# Patient Record
Sex: FEMALE | Race: WHITE | NOT HISPANIC OR LATINO | Employment: FULL TIME | URBAN - METROPOLITAN AREA
[De-identification: names, ages, dates, MRNs, and addresses within clinical notes are randomized per-mention and may not be internally consistent; named-entity substitution may affect disease eponyms.]

---

## 2018-01-05 ENCOUNTER — OFFICE VISIT (OUTPATIENT)
Dept: FAMILY MEDICINE CLINIC | Facility: CLINIC | Age: 33
End: 2018-01-05

## 2018-01-05 VITALS
BODY MASS INDEX: 42.43 KG/M2 | HEIGHT: 66 IN | DIASTOLIC BLOOD PRESSURE: 98 MMHG | RESPIRATION RATE: 18 BRPM | SYSTOLIC BLOOD PRESSURE: 140 MMHG | TEMPERATURE: 98.7 F | OXYGEN SATURATION: 99 % | WEIGHT: 264 LBS | HEART RATE: 105 BPM

## 2018-01-05 DIAGNOSIS — J01.00 ACUTE MAXILLARY SINUSITIS, RECURRENCE NOT SPECIFIED: Primary | ICD-10-CM

## 2018-01-05 DIAGNOSIS — R05.9 COUGH: ICD-10-CM

## 2018-01-05 PROCEDURE — 99213 OFFICE O/P EST LOW 20 MIN: CPT | Performed by: NURSE PRACTITIONER

## 2018-01-05 RX ORDER — AZITHROMYCIN 250 MG/1
TABLET, FILM COATED ORAL
Qty: 6 TABLET | Refills: 0 | Status: SHIPPED | OUTPATIENT
Start: 2018-01-05 | End: 2018-01-10

## 2018-01-05 RX ORDER — BENZONATATE 100 MG/1
100 CAPSULE ORAL 3 TIMES DAILY PRN
Qty: 30 CAPSULE | Refills: 1 | Status: SHIPPED | OUTPATIENT
Start: 2018-01-05 | End: 2018-01-15

## 2018-01-05 RX ORDER — ALBUTEROL SULFATE 90 UG/1
2 AEROSOL, METERED RESPIRATORY (INHALATION) EVERY 6 HOURS PRN
Qty: 1 INHALER | Refills: 2 | Status: SHIPPED | OUTPATIENT
Start: 2018-01-05 | End: 2019-09-09 | Stop reason: ALTCHOICE

## 2018-01-05 NOTE — PROGRESS NOTES
Subjective   Nany Guerrero is a 32 y.o. female.   Chief Complaint   Patient presents with   • Sinusitis   • Cough    Acute Visit.     Sinusitis   The current episode started in the past 7 days. The problem has been gradually worsening since onset. There has been no fever. Her pain is at a severity of 6/10. The pain is moderate. Associated symptoms include congestion, coughing, ear pain, headaches, a hoarse voice, sinus pressure, sneezing and a sore throat. Pertinent negatives include no chills or shortness of breath. (Ears popping   ) Past treatments include oral decongestants. The treatment provided mild relief.   Cough   The current episode started in the past 7 days. The problem has been gradually worsening. The problem occurs hourly. The cough is non-productive. Associated symptoms include ear pain, headaches, a sore throat and wheezing. Pertinent negatives include no chills, fever, postnasal drip or shortness of breath. Treatments tried: decongestant  The treatment provided mild relief. Her past medical history is significant for asthma, bronchitis and environmental allergies. There is no history of bronchiectasis, COPD, emphysema or pneumonia.      Patient reports she has been sick for 4 days.    The following portions of the patient's history were reviewed and updated as appropriate: allergies, current medications, past family history, past medical history, past social history, past surgical history and problem list.    Review of Systems   Constitutional: Positive for fatigue. Negative for activity change, appetite change, chills and fever.   HENT: Positive for congestion, ear pain, hoarse voice, sinus pain, sinus pressure, sneezing and sore throat. Negative for postnasal drip.         Sinus drainage thick - yellow   Upper teeth on the right aching yesterday    Respiratory: Positive for cough and wheezing. Negative for chest tightness and shortness of breath.    Cardiovascular: Negative.   "  Gastrointestinal: Negative.    Allergic/Immunologic: Positive for environmental allergies.   Neurological: Positive for headaches.       Objective   Allergies   Allergen Reactions   • Sulfa Antibiotics      Visit Vitals   • /98 (BP Location: Left arm, Patient Position: Sitting, Cuff Size: Adult)   • Pulse 105   • Temp 98.7 °F (37.1 °C) (Oral)   • Resp 18   • Ht 167.6 cm (66\")   • Wt 120 kg (264 lb)   • LMP 12/31/2017   • SpO2 99%   • BMI 42.61 kg/m2     Blood pressure is elevated. Patient report her last sudafed was a few hours ago.       Physical Exam   Constitutional: She appears well-developed and well-nourished. She is cooperative. She appears ill.   Face is flushed.   Blood pressure - 140/98 mm/hg   BPM     HENT:   Right Ear: Hearing, tympanic membrane, external ear and ear canal normal.   Left Ear: Hearing, tympanic membrane, external ear and ear canal normal.   Nose: Mucosal edema present. Right sinus exhibits maxillary sinus tenderness. Right sinus exhibits no frontal sinus tenderness. Left sinus exhibits maxillary sinus tenderness. Left sinus exhibits no frontal sinus tenderness.   Facial tenderness more on the right side.   Purulent yellow nasal drainage.      Cardiovascular: S1 normal, S2 normal and normal heart sounds.  Tachycardia present.    Pulmonary/Chest: Effort normal and breath sounds normal.   Neurological: She is alert.   Skin: Skin is warm, dry and intact.   Vitals reviewed.      Assessment/Plan   Nany was seen today for sinusitis and cough.    Diagnoses and all orders for this visit:    Acute maxillary sinusitis, recurrence not specified  -     azithromycin (ZITHROMAX) 250 MG tablet; Take 2 tablets the first day, then 1 tablet daily for 4 days.    Cough  -     benzonatate (TESSALON) 100 MG capsule; Take 1 capsule by mouth 3 (Three) Times a Day As Needed for Cough for up to 10 days.  -     albuterol (PROVENTIL HFA;VENTOLIN HFA) 108 (90 Base) MCG/ACT inhaler; Inhale 2 puffs " Every 6 (Six) Hours As Needed for Wheezing or Shortness of Air.        Follow up with Dr. Lyn if you fail to improve or you worsen.   See sinusitis and cough patient instructions.   Follow up for blood pressure with Dr. Lyn.   Patient is agreeable with the plan.                LILLY Moody

## 2018-01-05 NOTE — PATIENT INSTRUCTIONS
Cough, Adult  Coughing is a reflex that clears your throat and your airways. Coughing helps to heal and protect your lungs. It is normal to cough occasionally, but a cough that happens with other symptoms or lasts a long time may be a sign of a condition that needs treatment. A cough may last only 2-3 weeks (acute), or it may last longer than 8 weeks (chronic).  CAUSES  Coughing is commonly caused by:  Breathing in substances that irritate your lungs.  A viral or bacterial respiratory infection.  Allergies.  Asthma.  Postnasal drip.  Smoking.  Acid backing up from the stomach into the esophagus (gastroesophageal reflux).  Certain medicines.  Chronic lung problems, including COPD (or rarely, lung cancer).  Other medical conditions such as heart failure.  HOME CARE INSTRUCTIONS   Pay attention to any changes in your symptoms. Take these actions to help with your discomfort:  Take medicines only as told by your health care provider.  If you were prescribed an antibiotic medicine, take it as told by your health care provider. Do not stop taking the antibiotic even if you start to feel better.  Talk with your health care provider before you take a cough suppressant medicine.  Drink enough fluid to keep your urine clear or pale yellow.  If the air is dry, use a cold steam vaporizer or humidifier in your bedroom or your home to help loosen secretions.  Avoid anything that causes you to cough at work or at home.  If your cough is worse at night, try sleeping in a semi-upright position.  Avoid cigarette smoke. If you smoke, quit smoking. If you need help quitting, ask your health care provider.  Avoid caffeine.  Avoid alcohol.  Rest as needed.  SEEK MEDICAL CARE IF:   You have new symptoms.  You cough up pus.  Your cough does not get better after 2-3 weeks, or your cough gets worse.  You cannot control your cough with suppressant medicines and you are losing sleep.  You develop pain that is getting worse or pain that is not  controlled with pain medicines.  You have a fever.  You have unexplained weight loss.  You have night sweats.  SEEK IMMEDIATE MEDICAL CARE IF:  You cough up blood.  You have difficulty breathing.  Your heartbeat is very fast.     This information is not intended to replace advice given to you by your health care provider. Make sure you discuss any questions you have with your health care provider.     Document Released: 06/15/2012 Document Revised: 09/07/2016 Document Reviewed: 02/24/2016  [employer] Interactive Patient Education ©2017 [employer]  Sinusitis, Adult  Sinusitis is soreness and inflammation of your sinuses. Sinuses are hollow spaces in the bones around your face. Your sinuses are located:  · Around your eyes.  · In the middle of your forehead.  · Behind your nose.  · In your cheekbones.  Your sinuses and nasal passages are lined with a stringy fluid (mucus). Mucus normally drains out of your sinuses. When your nasal tissues become inflamed or swollen, the mucus can become trapped or blocked so air cannot flow through your sinuses. This allows bacteria, viruses, and funguses to grow, which leads to infection.  Sinusitis can develop quickly and last for 7-10 days (acute) or for more than 12 weeks (chronic). Sinusitis often develops after a cold.  CAUSES  This condition is caused by anything that creates swelling in the sinuses or stops mucus from draining, including:  · Allergies.  · Asthma.  · Bacterial or viral infection.  · Abnormally shaped bones between the nasal passages.  · Nasal growths that contain mucus (nasal polyps).  · Narrow sinus openings.  · Pollutants, such as chemicals or irritants in the air.  · A foreign object stuck in the nose.  · A fungal infection. This is rare.  RISK FACTORS  The following factors may make you more likely to develop this condition:  · Having allergies or asthma.  · Having had a recent cold or respiratory tract infection.  · Having structural deformities or  blockages in your nose or sinuses.  · Having a weak immune system.  · Doing a lot of swimming or diving.  · Overusing nasal sprays.  · Smoking.  SYMPTOMS  The main symptoms of this condition are pain and a feeling of pressure around the affected sinuses. Other symptoms include:  · Upper toothache.  · Earache.  · Headache.  · Bad breath.  · Decreased sense of smell and taste.  · A cough that may get worse at night.  · Fatigue.  · Fever.  · Thick drainage from your nose. The drainage is often green and it may contain pus (purulent).  · Stuffy nose or congestion.  · Postnasal drip. This is when extra mucus collects in the throat or back of the nose.  · Swelling and warmth over the affected sinuses.  · Sore throat.  · Sensitivity to light.  DIAGNOSIS  This condition is diagnosed based on symptoms, a medical history, and a physical exam. To find out if your condition is acute or chronic, your health care provider may:  · Look in your nose for signs of nasal polyps.  · Tap over the affected sinus to check for signs of infection.  · View the inside of your sinuses using an imaging device that has a light attached (endoscope).  If your health care provider suspects that you have chronic sinusitis, you may also:  · Be tested for allergies.  · Have a sample of mucus taken from your nose (nasal culture) and checked for bacteria.  · Have a mucus sample examined to see if your sinusitis is related to an allergy.  If your sinusitis does not respond to treatment and it lasts longer than 8 weeks, you may have an MRI or CT scan to check your sinuses. These scans also help to determine how severe your infection is.  In rare cases, a bone biopsy may be done to rule out more serious types of fungal sinus disease.  TREATMENT  Treatment for sinusitis depends on the cause and whether your condition is chronic or acute. If a virus is causing your sinusitis, your symptoms will go away on their own within 10 days. You may be given medicines  to relieve your symptoms, including:  · Topical nasal decongestants. They shrink swollen nasal passages and let mucus drain from your sinuses.  · Antihistamines. These drugs block inflammation that is triggered by allergies. This can help to ease swelling in your nose and sinuses.  · Topical nasal corticosteroids. These are nasal sprays that ease inflammation and swelling in your nose and sinuses.  · Nasal saline washes. These rinses can help to get rid of thick mucus in your nose.  If your condition is caused by bacteria, you will be given an antibiotic medicine. If your condition is caused by a fungus, you will be given an antifungal medicine.  Surgery may be needed to correct underlying conditions, such as narrow nasal passages. Surgery may also be needed to remove polyps.  HOME CARE INSTRUCTIONS  Medicines  · Take, use, or apply over-the-counter and prescription medicines only as told by your health care provider. These may include nasal sprays.  · If you were prescribed an antibiotic medicine, take it as told by your health care provider. Do not stop taking the antibiotic even if you start to feel better.  Hydrate and Humidify  · Drink enough water to keep your urine clear or pale yellow. Staying hydrated will help to thin your mucus.  · Use a cool mist humidifier to keep the humidity level in your home above 50%.  · Inhale steam for 10-15 minutes, 3-4 times a day or as told by your health care provider. You can do this in the bathroom while a hot shower is running.  · Limit your exposure to cool or dry air.  Rest  · Rest as much as possible.  · Sleep with your head raised (elevated).  · Make sure to get enough sleep each night.  General Instructions  · Apply a warm, moist washcloth to your face 3-4 times a day or as told by your health care provider. This will help with discomfort.  · Wash your hands often with soap and water to reduce your exposure to viruses and other germs. If soap and water are not  available, use hand .  · Do not smoke. Avoid being around people who are smoking (secondhand smoke).  · Keep all follow-up visits as told by your health care provider. This is important.  SEEK MEDICAL CARE IF:  · You have a fever.  · Your symptoms get worse.  · Your symptoms do not improve within 10 days.  SEEK IMMEDIATE MEDICAL CARE IF:  · You have a severe headache.  · You have persistent vomiting.  · You have pain or swelling around your face or eyes.  · You have vision problems.  · You develop confusion.  · Your neck is stiff.  · You have trouble breathing.     This information is not intended to replace advice given to you by your health care provider. Make sure you discuss any questions you have with your health care provider.     Document Released: 12/18/2006 Document Revised: 04/10/2017 Document Reviewed: 10/12/2016  [employer] Interactive Patient Education ©2017 [employer]

## 2018-07-11 ENCOUNTER — OFFICE VISIT (OUTPATIENT)
Dept: FAMILY MEDICINE CLINIC | Facility: CLINIC | Age: 33
End: 2018-07-11

## 2018-07-11 VITALS
HEART RATE: 101 BPM | WEIGHT: 256.8 LBS | DIASTOLIC BLOOD PRESSURE: 80 MMHG | TEMPERATURE: 97.9 F | SYSTOLIC BLOOD PRESSURE: 118 MMHG | BODY MASS INDEX: 41.27 KG/M2 | OXYGEN SATURATION: 99 % | HEIGHT: 66 IN

## 2018-07-11 DIAGNOSIS — M25.511 ACUTE PAIN OF RIGHT SHOULDER: Primary | ICD-10-CM

## 2018-07-11 PROCEDURE — 99213 OFFICE O/P EST LOW 20 MIN: CPT | Performed by: NURSE PRACTITIONER

## 2018-07-11 RX ORDER — CYCLOBENZAPRINE HCL 10 MG
10 TABLET ORAL 3 TIMES DAILY PRN
Qty: 30 TABLET | Refills: 1 | Status: SHIPPED | OUTPATIENT
Start: 2018-07-11 | End: 2019-09-09 | Stop reason: ALTCHOICE

## 2018-07-11 NOTE — PATIENT INSTRUCTIONS
Shoulder Pain  Many things can cause shoulder pain, including:  · An injury to the area.  · Overuse of the shoulder.  · Arthritis.    The source of the pain can be:  · Inflammation.  · An injury to the shoulder joint.  · An injury to a tendon, ligament, or bone.    Follow these instructions at home:  Take these actions to help with your pain:  · Squeeze a soft ball or a foam pad as much as possible. This helps to keep the shoulder from swelling. It also helps to strengthen the arm.  · Take over-the-counter and prescription medicines only as told by your health care provider.  · If directed, apply ice to the area:  ? Put ice in a plastic bag.  ? Place a towel between your skin and the bag.  ? Leave the ice on for 20 minutes, 2-3 times per day. Stop applying ice if it does not help with the pain.  · If you were given a shoulder sling or immobilizer:  ? Wear it as told.  ? Remove it to shower or bathe.  ? Move your arm as little as possible, but keep your hand moving to prevent swelling.    Contact a health care provider if:  · Your pain gets worse.  · Your pain is not relieved with medicines.  · New pain develops in your arm, hand, or fingers.  Get help right away if:  · Your arm, hand, or fingers:  ? Tingle.  ? Become numb.  ? Become swollen.  ? Become painful.  ? Turn white or blue.  This information is not intended to replace advice given to you by your health care provider. Make sure you discuss any questions you have with your health care provider.  Document Released: 09/27/2006 Document Revised: 08/13/2017 Document Reviewed: 04/11/2016  [employer] Interactive Patient Education © 2018 [employer]

## 2018-07-11 NOTE — PROGRESS NOTES
"Subjective   Nany Guerrero is a 33 y.o. female.   Chief Complaint   Patient presents with   • Shoulder Pain     right      Upper Extremity Issue   This is a new problem. The current episode started more than 1 month ago. The problem occurs intermittently. The problem has been gradually worsening. Associated symptoms include headaches, myalgias and neck pain. Pertinent negatives include no coughing or diaphoresis. The symptoms are aggravated by exertion. She has tried NSAIDs and ice for the symptoms. The treatment provided mild relief.      Started having symptoms 2 months ago.   Pain to right shoulder.   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   Works out at the [employer] using [employer].   Has been taking ibuprofen for pain.   Had some shaking in the right arm and hand a couple days ago when raising it to eat a sandwich.   Worsens as the day goes by.     The following portions of the patient's history were reviewed and updated as appropriate: allergies, current medications, past family history, past medical history, past social history, past surgical history and problem list.    Review of Systems   Constitutional: Negative for diaphoresis.   Eyes: Negative.    Respiratory: Negative.  Negative for cough.    Cardiovascular: Negative.    Gastrointestinal: Negative.    Musculoskeletal: Positive for myalgias and neck pain.   Skin: Negative.    Allergic/Immunologic: Negative.    Neurological: Positive for headaches.        Headache occasionally to the back of head.,      Psychiatric/Behavioral: Negative.        Objective   Allergies   Allergen Reactions   • Sulfa Antibiotics      Visit Vitals  /80 (BP Location: Left arm, Patient Position: Sitting, Cuff Size: Adult)   Pulse 101   Temp 97.9 °F (36.6 °C) (Temporal Artery )   Ht 167.6 cm (66\")   Wt 116 kg (256 lb 12.8 oz)   LMP 06/16/2018   SpO2 99%   BMI 41.45 kg/m²     \  Physical Exam   Cardiovascular: Normal rate and regular rhythm.    Pulmonary/Chest: Effort " normal and breath sounds normal.   Musculoskeletal: She exhibits tenderness.        Right shoulder: She exhibits decreased range of motion, tenderness, pain and spasm. She exhibits no swelling, no effusion, no crepitus, no deformity, no laceration, normal pulse and normal strength.   Skin: Skin is warm and dry. Capillary refill takes less than 2 seconds.   Psychiatric: She has a normal mood and affect. Her behavior is normal.   Vitals reviewed.      Assessment/Plan   Nany was seen today for shoulder pain.    Diagnoses and all orders for this visit:    Acute pain of right shoulder  -     MRI Shoulder Right Without Contrast; Future  -     Ambulatory Referral to Physical Therapy Evaluate and treat  -     diclofenac (VOLTAREN) 50 MG EC tablet; Take 1 tablet by mouth 2 (Two) Times a Day.  -     cyclobenzaprine (FLEXERIL) 10 MG tablet; Take 1 tablet by mouth 3 (Three) Times a Day As Needed for Muscle Spasms.        Follow up with Dr. Lyn if you worsen or fail to improve.     See shoulder pain patient instructions.          LILLY Moody

## 2018-07-18 ENCOUNTER — HOSPITAL ENCOUNTER (OUTPATIENT)
Dept: PHYSICAL THERAPY | Facility: HOSPITAL | Age: 33
Setting detail: THERAPIES SERIES
Discharge: HOME | End: 2018-07-18

## 2018-07-18 DIAGNOSIS — M25.511 ACUTE PAIN OF RIGHT SHOULDER: Primary | ICD-10-CM

## 2018-07-18 PROCEDURE — 97161 PT EVAL LOW COMPLEX 20 MIN: CPT | Performed by: PHYSICAL THERAPIST

## 2018-07-18 NOTE — THERAPY EVALUATION
Outpatient Physical Therapy Ortho Initial Evaluation  [facility]     Patient Name: Nany Guerrero  : 1985  MRN: [id number]  Today's Date: 2018      Visit Date: 2018    There is no problem list on file for this patient.       Past Medical History:   Diagnosis Date   • Asthma    • Obesity         Past Surgical History:   Procedure Laterality Date   •  SECTION     • CHOLECYSTECTOMY         Visit Dx:     ICD-10-CM ICD-9-CM   1. Acute pain of right shoulder M25.511 719.41             Patient History     Row Name 18 1000             History    Chief Complaint Pain  -HARLEEN      Type of Pain Shoulder pain   right  -HARLEEN      Date Current Problem(s) Began --   About 2 months ago  -HARLEEN      Brief Description of Current Complaint Pt. describes pain in back of shoulder into neck, stopped and then returned.  Had been working out with [employer] prior to symptom onset.  Symptoms are intermittent.    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  Has been able to continue working.  She has stopped exercising at home since onset of symptoms.  Enjoys reading, photography.  -HARLEEN      How has patient tried to help current problem? ice has not helped, ibuprofen helps  -HARLEEN      What clinical tests have you had for this problem? --   none  -HARLEEN      History of Previous Related Injuries no  -HARLEEN      Are you or can you be pregnant No  -HARLEEN         Pain     Pain Location Shoulder  -HARLEEN      Pain at Present 0  -HARLEEN      Pain at Best 0  -HARLEEN      Pain at Worst 7  -HARLEEN      Pain Frequency Intermittent  -HARLEEN      Pain Description Aching;Sharp  -HARLEEN      What Performance Factors Make the Current Problem(s) WORSE? after work, when trying to reach with arm or use R arm for basic activities.  -HARLEEN       What Performance Factors Make the Current Problem(s) BETTER? Ibuprofen  -HARLEEN      Is your sleep disturbed? No  -HARLEEN      Total hours of sleep per night 6-8  -HARLEEN         Fall Risk Assessment    Any falls in the past year: No  -HARLEEN         Daily Activities    Primary Language English  -HARLEEN      Are you able to read Yes  -HARLEEN      Are you able to write Yes  -HARLEEN        User Key  (r) = Recorded By, (t) = Taken By, (c) = Cosigned By    Initials Name Provider Type    HARLEEN Vega PT Physical Therapist                PT Ortho     Row Name 07/18/18 1400       Posture/Observations    Posture/Observations Comments FHP, protracted bilateral scapulae  -HARLEEN       Special Tests/Palpation    Special Tests/Palpation --   Pt. denies TTP.  -HARLEEN       Shoulder Impingement/Rotator Cuff Special Tests    Neer Impingement Test (RC Lesion vs. Bursitis) Right:;Positive  -HARLEEN    Full Can Test (RC Lesion) Right:;Positive  -HARLEEN    Empty Can Test (RC Lesion) Right:;Positive  -HARLEEN    Drop Arm Test (Full Thickness RC Lesion) Negative  -HARLEEN    Lift-Off Test (Subscapularis Lesion) Negative  -HARLEEN    Belly Press (Subscapularis Lesion) Negative  -HARLEEN    Speed's Test (LH of Biceps Lesion) Negative  -HARLEEN       General ROM    GENERAL ROM COMMENTS Pt. demonstrates AROM to WFL's in bilateral shoulders.  R shoulder is uncomfortable at end range elevation.  No pain with resisted rotation.  -HARLEEN       General Assessment (Manual Muscle Testing)    Comment, General Manual Muscle Testing (MMT) Assessment Bilateral UE strength is WFL's.  R resisted abduction is uncomfortable more than flexion.  -HARLEEN       Sensation    Sensation WNL? WNL  -HARLEEN       Upper Extremity Flexibility    Pect Minor Bilateral:;Moderately limited  -HARLEEN    Pect Major Bilateral:;Moderately limited  -HARLEEN      User Key  (r) = Recorded By, (t) = Taken By, (c) = Cosigned By    Initials Name Provider Type    HARLEEN Vega PT Physical Therapist                      Therapy Education  Given:  HEP  Program: New  How Provided: Verbal, Demonstration, Written  Provided to: Patient  Level of Understanding: Verbalized, Demonstrated           PT OP Goals     Row Name 07/18/18 1400          PT Short Term Goals    STG Date to Achieve 08/15/18  -     STG 1 Pt. demonstrates independence in initial HEP.  -HARLEEN     STG 2 Pt. reports decreasing intensity of pain to no worse than 4/10.  -HARLEEN     STG 3 Quick DASH score is improved to 20 or less.  -        Long Term Goals    LTG Date to Achieve 08/29/18  -     LTG 1 Pt. is independent in advanced HEP to prevent symptom recurrence.  -HARLEEN     LTG 2 Pain is occasional and no worse than 2/10.  -HARLEEN     LTG 3 Pt. demonstrates correct sitting and standing posture to reduce shoulder strain.  -        Time Calculation    PT Goal Re-Cert Due Date 10/16/18  -       User Key  (r) = Recorded By, (t) = Taken By, (c) = Cosigned By    Initials Name Provider Type    HARLEEN Merlyn Vega, PT Physical Therapist                PT Assessment/Plan     Row Name 07/18/18 1500          PT Assessment    Functional Limitations Limitations in functional capacity and performance;Performance in leisure activities;Performance in self-care ADL  -     Impairments Posture;Pain;Impaired flexibility;Poor body mechanics  -     Assessment Comments Clinical presentation consistent with mild RC strain with positive impingement signs.  Pt. will benefit from PT intervention to educate in postural improvement and strengthening to address current symptoms and prevent recurrence of symptoms.  -HARLEEN     Please refer to paper survey for additional self-reported information Yes  -HARLEEN     Rehab Potential Good  -HARLEEN     Patient/caregiver participated in establishment of treatment plan and goals Yes  -HARLEEN     Patient would benefit from skilled therapy intervention Yes  -HARLEEN        PT Plan    PT Frequency 2x/week  -HARLEEN     Predicted Duration of Therapy Intervention (Therapy Eval) 12 visits  -HARLEEN     Planned CPT's? PT  EVAL LOW COMPLEXITY: 38447;PT THER PROC EA 15 MIN: 71866;PT MANUAL THERAPY EA 15 MIN: 06000;PT NEUROMUSC RE-EDUCATION EA 15 MIN: 84360;PT IONTOPHORESIS EA 15 MIN: 90108;PT HOT/COLD PACK WC NONMCARE: 59580  -HARLEEN     PT Plan Comments PT 2x/week per POC.  -HARLEEN       User Key  (r) = Recorded By, (t) = Taken By, (c) = Cosigned By    Initials Name Provider Type    HARLEEN Vega PT Physical Therapist                              Outcome Measure Options: Quick DASH  Quick DASH  Open a tight or new jar.: Moderate Difficulty  Do heavy household chores (e.g., wash walls, wash floors): Moderate Difficulty  Carry a shopping bag or briefcase: Mild Difficulty  Wash your back: Mild Difficulty  Use a knife to cut food: No Difficulty  Recreational activities in which you take some force or impact through your arm, should or hand (e.g. golf, hammering, tennis, etc.): Mild Difficulty  During the past week, to what extent has your arm, shoulder, or hand problem interfered with your normal social activites with family, friends, neighbors or groups?: Moderately  During the past week, were you limited in your work or other regular daily activities as a result of your arm, shoulder or hand problem?: Slightly Limited  Arm, Shoulder, or hand pain: Moderate  Tingling (pins and needles) in your arm, shoulder, or hand: None  During the past week, how much difficulty have you had sleeping because of the pain in your arm, shoulder or hand?: No difficulty  Number of Questions Answered: 11  Quick DASH Score: 27.27         Time Calculation:     Therapy Suggested Charges     Code   Minutes Charges    None             Start Time: 1030     Therapy Charges for Today     Code Description Service Date Service Provider Modifiers Qty    08506980220 HC PT EVAL LOW COMPLEXITY 3 7/18/2018 Merlyn Vega, PT GP 1          PT G-Codes  Outcome Measure Options: Quick DASH         Merlyn Vega PT  7/18/2018

## 2018-07-23 ENCOUNTER — APPOINTMENT (OUTPATIENT)
Dept: PHYSICAL THERAPY | Facility: HOSPITAL | Age: 33
End: 2018-07-23

## 2018-07-25 ENCOUNTER — HOSPITAL ENCOUNTER (OUTPATIENT)
Dept: PHYSICAL THERAPY | Facility: HOSPITAL | Age: 33
Setting detail: THERAPIES SERIES
Discharge: HOME | End: 2018-07-25

## 2018-07-25 DIAGNOSIS — M25.511 ACUTE PAIN OF RIGHT SHOULDER: Primary | ICD-10-CM

## 2018-07-25 PROCEDURE — 97110 THERAPEUTIC EXERCISES: CPT

## 2018-07-25 NOTE — THERAPY TREATMENT NOTE
Outpatient Physical Therapy Ortho Treatment Note  [facility]     Patient Name: Nany Guerrero  : 1985  MRN: [id number]  Today's Date: 2018      Visit Date: 2018    Visit Dx:    ICD-10-CM ICD-9-CM   1. Acute pain of right shoulder M25.511 719.41       There is no problem list on file for this patient.       Past Medical History:   Diagnosis Date   • Asthma    • Obesity         Past Surgical History:   Procedure Laterality Date   •  SECTION     • CHOLECYSTECTOMY               PT Ortho     Row Name 18 1300       Subjective Comments    Subjective Comments Pt. reports that pectoral stretch in doorway is painful for her.  Other exercises are ok.  -HARLEEN       Subjective Pain    Able to rate subjective pain? yes  -HARLEEN    Pre-Treatment Pain Level 0  -HARLEEN    Post-Treatment Pain Level 0  -HARLEEN      User Key  (r) = Recorded By, (t) = Taken By, (c) = Cosigned By    Initials Name Provider Type    HARLEEN Vega, PT Physical Therapist                            PT Assessment/Plan     Row Name 18 1300          PT Assessment    Assessment Comments R shoulder ROM is WNL's without pain today.  Pt. has tightness in R pectoral mm. and R supraclavicular and posterior-lateral cervical mm.  -HARLEEN        PT Plan    PT Plan Comments Continue PT.  Progress/modify exercises as indicated based on symptom response.  -HARLEEN       User Key  (r) = Recorded By, (t) = Taken By, (c) = Cosigned By    Initials Name Provider Type    HARLEEN Vega, PT Physical Therapist                    Exercises     Row Name 18 1300             Subjective Comments    Subjective Comments Pt. reports that pectoral stretch in doorway is painful for her.  Other exercises are ok.  -HARLEEN         Subjective Pain    Able to rate subjective pain? yes  -HARLEEN      Pre-Treatment Pain Level 0  -HARLEEN      Post-Treatment Pain Level 0  -HARLEEN         Total Minutes    35174 - PT Therapeutic Exercise Minutes 20  -HARLEEN         Exercise 1     Exercise Name 1 Reviewed existing HEP.  Pt. was using higher arm position for pectoral stretch.  When arm position was corrected as illustrated in HEP, the stretch was not painful.  Reinforced sitting posture for work and advised frequent stretch breaks during the day.  Added cervical exercises and additional scapular retraction exercise to HEP.  -HARLEEN      Time 1 20  -HARLEEN        User Key  (r) = Recorded By, (t) = Taken By, (c) = Cosigned By    Initials Name Provider Type    HARLEEN Vega, PT Physical Therapist                                            Time Calculation:   Start Time: 1000  Total Timed Code Minutes- PT: 20 minute(s)  Therapy Suggested Charges     Code   Minutes Charges    39426 (CPT®) Hc Pt Neuromusc Re Education Ea 15 Min      63301 (CPT®) Hc Pt Ther Proc Ea 15 Min 20 1    31852 (CPT®) Hc Gait Training Ea 15 Min      47801 (CPT®) Hc Pt Therapeutic Act Ea 15 Min      43514 (CPT®) Hc Pt Manual Therapy Ea 15 Min      34675 (CPT®) Hc Pt Ther Massage- Per 15 Min      10344 (CPT®) Hc Pt Iontophoresis Ea 15 Min      66642 (CPT®) Hc Pt Elec Stim Ea-Per 15 Min      98465 (CPT®) Hc Pt Ultrasound Ea 15 Min      91560 (CPT®) Hc Pt Self Care/Mgmt/Train Ea 15 Min      Total  20 1                      Merlyn Vega, PT  7/25/2018

## 2018-07-30 ENCOUNTER — HOSPITAL ENCOUNTER (OUTPATIENT)
Dept: PHYSICAL THERAPY | Facility: HOSPITAL | Age: 33
Setting detail: THERAPIES SERIES
Discharge: HOME | End: 2018-07-30

## 2018-07-30 DIAGNOSIS — M25.511 ACUTE PAIN OF RIGHT SHOULDER: Primary | ICD-10-CM

## 2018-07-30 PROCEDURE — 97110 THERAPEUTIC EXERCISES: CPT

## 2018-07-30 NOTE — THERAPY TREATMENT NOTE
Outpatient Physical Therapy Ortho Treatment Note  [facility]     Patient Name: Nany Guerrero  : 1985  MRN: [id number]  Today's Date: 2018      Visit Date: 2018    Visit Dx:    ICD-10-CM ICD-9-CM   1. Acute pain of right shoulder M25.511 719.41       There is no problem list on file for this patient.       Past Medical History:   Diagnosis Date   • Asthma    • Obesity         Past Surgical History:   Procedure Laterality Date   •  SECTION     • CHOLECYSTECTOMY               PT Ortho     Row Name 18 1000       Subjective Comments    Subjective Comments Pt. reports mild discomfort in R UT area today.  She reports a brief episode of tingling in her R arm while riding in car during the weekend.  It resloved with movement/position change.  -HARLEEN       Subjective Pain    Able to rate subjective pain? yes  -HARLEEN    Pre-Treatment Pain Level 2  -HARLEEN    Post-Treatment Pain Level 0  -HARLEEN      User Key  (r) = Recorded By, (t) = Taken By, (c) = Cosigned By    Initials Name Provider Type    HARLEEN Vega, PT Physical Therapist                            PT Assessment/Plan     Row Name 18 1100          PT Assessment    Assessment Comments Positive median nerve tension with mild cervical radiculopathy, responsive to prescribed exercises.   -HARLEEN        PT Plan    PT Plan Comments Continue and progress as symptoms indicate.  -HARLEEN       User Key  (r) = Recorded By, (t) = Taken By, (c) = Cosigned By    Initials Name Provider Type    HARLEEN Vega PT Physical Therapist                    Exercises     Row Name 18 1000             Subjective Comments    Subjective Comments Pt. reports mild discomfort in R UT area today.  She reports a brief episode of tingling in her R arm while riding in car during the weekend.  It resloved with movement/position change.  -HARLEEN         Subjective Pain    Able to rate subjective pain? yes  -HARLEEN      Pre-Treatment Pain Level 2  -HARLEEN       Post-Treatment Pain Level 0  -HARLEEN         Total Minutes    36280 - PT Therapeutic Exercise Minutes 25  -HARLEEN         Exercise 1    Exercise Name 1 Reinforced postural education, particularly scapular retraction and depression.  Reviewed and practiced chin tuck combined with R lateral flexion, R rotation, and extension.  Pt. was advised to choose any of these exercises which are effective in alleviating symptoms.  Pt. had brief episode of R UE tingling when she relaxed her arm to side of chair.  Testing reveals (+) median nerve tension.  Added neural gliding exercises to HEP after practice in clinic today.  Pt. was able to successfully eliminate symptoms with ex. performance.  -HARLEEN      Time 1 25  -HARLEEN        User Key  (r) = Recorded By, (t) = Taken By, (c) = Cosigned By    Initials Name Provider Type    HARLEEN Vega PT Physical Therapist                                            Time Calculation:   Start Time: 1000  Total Timed Code Minutes- PT: 25 minute(s)  Therapy Suggested Charges     Code   Minutes Charges    29757 (CPT®)  Pt Neuromusc Re Education Ea 15 Min      88449 (CPT®) Hc Pt Ther Proc Ea 15 Min 25 2    59520 (CPT®) Hc Gait Training Ea 15 Min      75823 (CPT®) Hc Pt Therapeutic Act Ea 15 Min      73364 (CPT®) Hc Pt Manual Therapy Ea 15 Min      81442 (CPT®) Hc Pt Ther Massage- Per 15 Min      33497 (CPT®) Hc Pt Iontophoresis Ea 15 Min      07311 (CPT®) Hc Pt Elec Stim Ea-Per 15 Min      39909 (CPT®)  Pt Ultrasound Ea 15 Min      84864 (CPT®)  Pt Self Care/Mgmt/Train Ea 15 Min      Total  25 2                      Merlyn Vega PT  7/30/2018

## 2018-08-06 ENCOUNTER — HOSPITAL ENCOUNTER (OUTPATIENT)
Dept: PHYSICAL THERAPY | Facility: HOSPITAL | Age: 33
Setting detail: THERAPIES SERIES
Discharge: HOME | End: 2018-08-06

## 2018-08-06 DIAGNOSIS — M25.511 ACUTE PAIN OF RIGHT SHOULDER: Primary | ICD-10-CM

## 2018-08-06 PROCEDURE — 97140 MANUAL THERAPY 1/> REGIONS: CPT

## 2018-08-06 PROCEDURE — 97110 THERAPEUTIC EXERCISES: CPT

## 2018-08-06 NOTE — THERAPY TREATMENT NOTE
Outpatient Physical Therapy Ortho Treatment Note  [facility]     Patient Name: Nany Guerrero  : 1985  MRN: [id number]  Today's Date: 2018      Visit Date: 2018    Visit Dx:    ICD-10-CM ICD-9-CM   1. Acute pain of right shoulder M25.511 719.41       There is no problem list on file for this patient.       Past Medical History:   Diagnosis Date   • Asthma    • Obesity         Past Surgical History:   Procedure Laterality Date   •  SECTION     • CHOLECYSTECTOMY               PT Ortho     Row Name 18 1000       Subjective Comments    Subjective Comments Pt. has felt well most of the week, but yesterday was not good.  Pt. had feeling of weakness in her right arm as she was standing in [employer].  Today, she reports no pain at rest, and mild discomfort with R UE motion.  -HARLEEN       Subjective Pain    Able to rate subjective pain? yes  -HARLEEN    Pre-Treatment Pain Level 2  -HARLEEN    Post-Treatment Pain Level 0  -HARLEEN      User Key  (r) = Recorded By, (t) = Taken By, (c) = Cosigned By    Initials Name Provider Type    Merlyn Quinones, PT Physical Therapist                            PT Assessment/Plan     Row Name 18 1000          PT Assessment    Assessment Comments Limited cervical motion into retraction.  R shoulder mobility initially limited with foam roll exercises, improved after joint mobilization.  -HARLEEN        PT Plan    PT Plan Comments Continue and progress as tolerated.  -HARLEEN       User Key  (r) = Recorded By, (t) = Taken By, (c) = Cosigned By    Initials Name Provider Type    Merlyn Quinones, SUN Physical Therapist                    Exercises     Row Name 18 1049 18 1000 18 0900       Subjective Comments    Subjective Comments  -- Pt. has felt well most of the week, but yesterday was not good.  Pt. had feeling of weakness in her right arm as she was standing in [employer].  Today, she reports no pain at rest, and mild discomfort with R UE motion.  -HARLEEN   --       Subjective Pain    Able to rate subjective pain?  -- yes  -HARLEEN  --    Pre-Treatment Pain Level  -- 2  -HARLEEN  --    Post-Treatment Pain Level  -- 0  -HARLEEN  --       Total Minutes    71872 - PT Therapeutic Exercise Minutes  -- 20  -HARLEEN  --    74926 - PT Manual Therapy Minutes --  -HARLEEN  -- 8  -HARLEEN       Exercise 1    Exercise Name 1  -- Continued exercise in clinical setting, emphasizing cervical and scapular posture and mobility.  Added foam roll series of exercises to HEP which pt. will perform initially laying flat without foam roll.  She will continue to work on chin tuck to improve mobility into cervical retraction.  -HARLEEN  --    Time 1  -- 20  -HARLEEN  --      User Key  (r) = Recorded By, (t) = Taken By, (c) = Cosigned By    Initials Name Provider Type    Merlyn Quinones PT Physical Therapist                        Manual Rx (last 36 hours)      Manual Treatments     Row Name 08/06/18 1049 08/06/18 0900          Total Minutes    05617 - PT Manual Therapy Minutes --  -HARLEEN 8  -HARLEEN       User Key  (r) = Recorded By, (t) = Taken By, (c) = Cosigned By    Initials Name Provider Type    Merlyn Quinones, PT Physical Therapist                             Time Calculation:   Start Time: 1000  Total Timed Code Minutes- PT: 28 minute(s)  Therapy Suggested Charges     Code   Minutes Charges    38738 (CPT®) Hc Pt Neuromusc Re Education Ea 15 Min      82453 (CPT®) Hc Pt Ther Proc Ea 15 Min 20 1    08370 (CPT®) Hc Gait Training Ea 15 Min      35628 (CPT®) Hc Pt Therapeutic Act Ea 15 Min      95122 (CPT®) Hc Pt Manual Therapy Ea 15 Min 8 1    02246 (CPT®) Hc Pt Ther Massage- Per 15 Min      48574 (CPT®) Hc Pt Iontophoresis Ea 15 Min      18501 (CPT®) Hc Pt Elec Stim Ea-Per 15 Min      76966 (CPT®) Hc Pt Ultrasound Ea 15 Min      01865 (CPT®) Hc Pt Self Care/Mgmt/Train Ea 15 Min      Total  28 2                      Merlyn Vega PT  8/6/2018

## 2018-08-08 ENCOUNTER — HOSPITAL ENCOUNTER (OUTPATIENT)
Dept: PHYSICAL THERAPY | Facility: HOSPITAL | Age: 33
Setting detail: THERAPIES SERIES
Discharge: HOME | End: 2018-08-08

## 2018-08-08 DIAGNOSIS — M25.511 ACUTE PAIN OF RIGHT SHOULDER: Primary | ICD-10-CM

## 2018-08-08 PROCEDURE — 97110 THERAPEUTIC EXERCISES: CPT

## 2018-08-08 NOTE — THERAPY TREATMENT NOTE
Outpatient Physical Therapy Ortho Treatment Note  [facility]     Patient Name: Nany Guerrero  : 1985  MRN: [id number]  Today's Date: 2018      Visit Date: 2018    Visit Dx:    ICD-10-CM ICD-9-CM   1. Acute pain of right shoulder M25.511 719.41       There is no problem list on file for this patient.       Past Medical History:   Diagnosis Date   • Asthma    • Obesity         Past Surgical History:   Procedure Laterality Date   •  SECTION     • CHOLECYSTECTOMY               PT Ortho     Row Name 18 1000       Subjective Comments    Subjective Comments Pt. reports no pain this morning.  She has already been at work for a couple of hours.  Most recently prescribed exercises are going well.  -HARLEEN       Subjective Pain    Able to rate subjective pain? yes  -HARLEEN    Pre-Treatment Pain Level 0  -HARLEEN    Post-Treatment Pain Level 0  -HARLEEN    Row Name 18 1000       Subjective Comments    Subjective Comments Pt. has felt well most of the week, but yesterday was not good.  Pt. had feeling of weakness in her right arm as she was standing in [employer].  Today, she reports no pain at rest, and mild discomfort with R UE motion.  -HARLEEN       Subjective Pain    Able to rate subjective pain? yes  -HARLEEN    Pre-Treatment Pain Level 2  -HARLEEN    Post-Treatment Pain Level 0  -HARLEEN      User Key  (r) = Recorded By, (t) = Taken By, (c) = Cosigned By    Initials Name Provider Type    Merlyn Quinones PT Physical Therapist                            PT Assessment/Plan     Row Name 18 1000          PT Assessment    Assessment Comments Symptoms well-controlled today.  Pt. is doing well with HEP.  -HARLEEN        PT Plan    PT Plan Comments Continue and progress as tolerated.  -HARLEEN       User Key  (r) = Recorded By, (t) = Taken By, (c) = Cosigned By    Initials Name Provider Type    Merlyn Quinones PT Physical Therapist                    Exercises     Row Name 18 1000             Subjective  Comments    Subjective Comments Pt. reports no pain this morning.  She has already been at work for a couple of hours.  Most recently prescribed exercises are going well.  -HARLEEN         Subjective Pain    Able to rate subjective pain? yes  -HARLEEN      Pre-Treatment Pain Level 0  -HARLEEN      Post-Treatment Pain Level 0  -HARLEEN         Total Minutes    84921 - PT Therapeutic Exercise Minutes 15  -HARLEEN         Exercise 1    Exercise Name 1 Added shoulder extension and lat pull down with yellow band to HEP.  Pt. is compliant with recommendations to improve work posture and to take frequent breaks for change of position.  -HARLEEN      Time 1 15  -HARLEEN        User Key  (r) = Recorded By, (t) = Taken By, (c) = Cosigned By    Initials Name Provider Type    Merlyn Quinones, PT Physical Therapist                                            Time Calculation:   Start Time: 1000  Total Timed Code Minutes- PT: 15 minute(s)  Therapy Suggested Charges     Code   Minutes Charges    23164 (CPT®) Hc Pt Neuromusc Re Education Ea 15 Min      06890 (CPT®) Hc Pt Ther Proc Ea 15 Min 15 1    26540 (CPT®) Hc Gait Training Ea 15 Min      77646 (CPT®) Hc Pt Therapeutic Act Ea 15 Min      76454 (CPT®) Hc Pt Manual Therapy Ea 15 Min      31495 (CPT®) Hc Pt Ther Massage- Per 15 Min      93260 (CPT®) Hc Pt Iontophoresis Ea 15 Min      77130 (CPT®) Hc Pt Elec Stim Ea-Per 15 Min      96151 (CPT®) Hc Pt Ultrasound Ea 15 Min      16123 (CPT®) Hc Pt Self Care/Mgmt/Train Ea 15 Min      Total  15 1                      Merlyn Vega, PT  8/8/2018

## 2018-08-23 ENCOUNTER — APPOINTMENT (OUTPATIENT)
Dept: PHYSICAL THERAPY | Facility: HOSPITAL | Age: 33
End: 2018-08-23

## 2018-09-07 ENCOUNTER — DOCUMENTATION (OUTPATIENT)
Dept: PHYSICAL THERAPY | Facility: HOSPITAL | Age: 33
End: 2018-09-07

## 2018-09-07 DIAGNOSIS — M25.511 ACUTE PAIN OF RIGHT SHOULDER: Primary | ICD-10-CM

## 2018-09-07 NOTE — THERAPY DISCHARGE NOTE
Outpatient Physical Therapy Discharge Summary         Patient Name: Nany Guerrero  : 1985  MRN: [id number]    Today's Date: 2018    Visit Dx:    ICD-10-CM ICD-9-CM   1. Acute pain of right shoulder M25.511 719.41             PT OP Goals     Row Name 18 0900          PT Short Term Goals    STG Date to Achieve 08/15/18  -HARLEEN     STG 1 Pt. demonstrates independence in initial HEP.  -HARLEEN     STG 2 Pt. reports decreasing intensity of pain to no worse than 4/10.  -HARLEEN     STG 3 Quick DASH score is improved to 20 or less.  -HARLEEN        Long Term Goals    LTG Date to Achieve 18  -HARLEEN     LTG 1 Pt. is independent in advanced HEP to prevent symptom recurrence.  -HARLEEN     LTG 2 Pain is occasional and no worse than 2/10.  -HARLEEN     LTG 3 Pt. demonstrates correct sitting and standing posture to reduce shoulder strain.  -HARLEEN       User Key  (r) = Recorded By, (t) = Taken By, (c) = Cosigned By    Initials Name Provider Type    Merlyn Quinones, PT Physical Therapist          OP PT Discharge Summary  Date of Discharge: 18  Reason for Discharge: Patient/Caregiver request  Outcomes Achieved:  (Pt. reported feeling better.  Did not return for formal reassessment.)  Discharge Destination: Home with home program      Time Calculation:        Therapy Suggested Charges     Code   Minutes Charges    None                       Merlyn Vega, SUN  2018

## 2019-09-09 ENCOUNTER — OFFICE VISIT (OUTPATIENT)
Dept: RETAIL CLINIC | Facility: CLINIC | Age: 34
End: 2019-09-09

## 2019-09-09 VITALS
WEIGHT: 262.8 LBS | TEMPERATURE: 100.7 F | HEART RATE: 138 BPM | HEIGHT: 66 IN | OXYGEN SATURATION: 96 % | SYSTOLIC BLOOD PRESSURE: 138 MMHG | RESPIRATION RATE: 18 BRPM | DIASTOLIC BLOOD PRESSURE: 92 MMHG | BODY MASS INDEX: 42.23 KG/M2

## 2019-09-09 DIAGNOSIS — R50.9 FEVER, UNSPECIFIED FEVER CAUSE: ICD-10-CM

## 2019-09-09 DIAGNOSIS — B34.9 VIRAL ILLNESS: ICD-10-CM

## 2019-09-09 DIAGNOSIS — R68.89 FLU-LIKE SYMPTOMS: Primary | ICD-10-CM

## 2019-09-09 LAB
EXPIRATION DATE: NORMAL
FLUAV AG NPH QL: NEGATIVE
FLUBV AG NPH QL: NEGATIVE
INTERNAL CONTROL: NORMAL
Lab: NORMAL

## 2019-09-09 PROCEDURE — 87804 INFLUENZA ASSAY W/OPTIC: CPT | Performed by: NURSE PRACTITIONER

## 2019-09-09 PROCEDURE — 99213 OFFICE O/P EST LOW 20 MIN: CPT | Performed by: NURSE PRACTITIONER

## 2019-09-09 NOTE — PATIENT INSTRUCTIONS
Viral Illness, Adult  Viruses are tiny germs that can get into a person's body and cause illness. There are many different types of viruses, and they cause many types of illness. Viral illnesses can range from mild to severe. They can affect various parts of the body.  Common illnesses that are caused by a virus include colds and the flu. Viral illnesses also include serious conditions such as HIV/AIDS (human immunodeficiency virus/acquired immunodeficiency syndrome). A few viruses have been linked to certain cancers.  What are the causes?  Many types of viruses can cause illness. Viruses invade cells in your body, multiply, and cause the infected cells to malfunction or die. When the cell dies, it releases more of the virus. When this happens, you develop symptoms of the illness, and the virus continues to spread to other cells. If the virus takes over the function of the cell, it can cause the cell to divide and grow out of control, as is the case when a virus causes cancer.  Different viruses get into the body in different ways. You can get a virus by:  · Swallowing food or water that is contaminated with the virus.  · Breathing in droplets that have been coughed or sneezed into the air by an infected person.  · Touching a surface that has been contaminated with the virus and then touching your eyes, nose, or mouth.  · Being bitten by an insect or animal that carries the virus.  · Having sexual contact with a person who is infected with the virus.  · Being exposed to blood or fluids that contain the virus, either through an open cut or during a transfusion.  If a virus enters your body, your body's defense system (immune system) will try to fight the virus. You may be at higher risk for a viral illness if your immune system is weak.  What are the signs or symptoms?  Symptoms vary depending on the type of virus and the location of the cells that it invades. Common symptoms of the main types of viral illnesses  include:  Cold and flu viruses  · Fever.  · Headache.  · Sore throat.  · Muscle aches.  · Nasal congestion.  · Cough.  Digestive system (gastrointestinal) viruses  · Fever.  · Abdominal pain.  · Nausea.  · Diarrhea.  Liver viruses (hepatitis)  · Loss of appetite.  · Tiredness.  · Yellowing of the skin (jaundice).  Brain and spinal cord viruses  · Fever.  · Headache.  · Stiff neck.  · Nausea and vomiting.  · Confusion or sleepiness.  Skin viruses  · Warts.  · Itching.  · Rash.  Sexually transmitted viruses  · Discharge.  · Swelling.  · Redness.  · Rash.  How is this treated?  Viruses can be difficult to treat because they live within cells. Antibiotic medicines do not treat viruses because these drugs do not get inside cells. Treatment for a viral illness may include:  · Resting and drinking plenty of fluids.  · Medicines to relieve symptoms. These can include over-the-counter medicine for pain and fever, medicines for cough or congestion, and medicines to relieve diarrhea.  · Antiviral medicines. These drugs are available only for certain types of viruses. They may help reduce flu symptoms if taken early. There are also many antiviral medicines for hepatitis and HIV/AIDS.  Some viral illnesses can be prevented with vaccinations. A common example is the flu shot.  Follow these instructions at home:  Medicines    · Take over-the-counter and prescription medicines only as told by your health care provider.  · If you were prescribed an antiviral medicine, take it as told by your health care provider. Do not stop taking the medicine even if you start to feel better.  · Be aware of when antibiotics are needed and when they are not needed. Antibiotics do not treat viruses. If your health care provider thinks that you may have a bacterial infection as well as a viral infection, you may get an antibiotic.  ? Do not ask for an antibiotic prescription if you have been diagnosed with a viral illness. That will not make your  illness go away faster.  ? Frequently taking antibiotics when they are not needed can lead to antibiotic resistance. When this develops, the medicine no longer works against the bacteria that it normally fights.  General instructions  · Drink enough fluids to keep your urine clear or pale yellow.  · Rest as much as possible.  · Return to your normal activities as told by your health care provider. Ask your health care provider what activities are safe for you.  · Keep all follow-up visits as told by your health care provider. This is important.  How is this prevented?  Take these actions to reduce your risk of viral infection:  · Eat a healthy diet and get enough rest.  · Wash your hands often with soap and water. This is especially important when you are in public places. If soap and water are not available, use hand .  · Avoid close contact with friends and family who have a viral illness.  · If you travel to areas where viral gastrointestinal infection is common, avoid drinking water or eating raw food.  · Keep your immunizations up to date. Get a flu shot every year as told by your health care provider.  · Do not share toothbrushes, nail clippers, razors, or needles with other people.  · Always practice safe sex.    Contact a health care provider if:  · You have symptoms of a viral illness that do not go away.  · Your symptoms come back after going away.  · Your symptoms get worse.  Get help right away if:  · You have trouble breathing.  · You have a severe headache or a stiff neck.  · You have severe vomiting or abdominal pain.  This information is not intended to replace advice given to you by your health care provider. Make sure you discuss any questions you have with your health care provider.  Document Released: 04/28/2017 Document Revised: 05/31/2017 Document Reviewed: 04/28/2017  [employer] Interactive Patient Education © 2019 [employer]    Fever, Adult    A fever is an increase in the body’s  temperature. It is often defined as a temperature of 100° F (38°C) or higher. Short mild or moderate fevers often have no long-term effects. They also often do not need treatment. Moderate or high fevers may make you feel uncomfortable. Sometimes, they can also be a sign of a serious illness or disease. The sweating that may happen with repeated fevers or fevers that last a while may also cause you to not have enough fluid in your body (dehydration).  You can take your temperature with a thermometer to see if you have a fever. A measured temperature can change with:  · Age.  · Time of day.  · Where the thermometer is placed:  ? Mouth (oral).  ? Rectum (rectal).  ? Ear (tympanic).  ? Underarm (axillary).  ? Forehead (temporal).  Follow these instructions at home:  Pay attention to any changes in your symptoms. Take these actions to help with your condition:  · Take over-the-counter and prescription medicines only as told by your doctor. Follow the dosing instructions carefully.  · If you were prescribed an antibiotic medicine, take it as told by your doctor. Do not stop taking the antibiotic even if you start to feel better.  · Rest as needed.  · Drink enough fluid to keep your pee (urine) clear or pale yellow.  · Sponge yourself or bathe with room-temperature water as needed. This helps to lower your body temperature . Do not use ice water.  · Do not wear too many blankets or heavy clothes.  Contact a doctor if:  · You throw up (vomit).  · You cannot eat or drink without throwing up.  · You have watery poop (diarrhea).  · It hurts when you pee.  · Your symptoms do not get better with treatment.  · You have new symptoms.  · You feel very weak.  Get help right away if:  · You are short of breath or have trouble breathing.  · You are dizzy or you pass out (faint).  · You feel confused.  · You have signs of not having enough fluid in your body, such as:  ? A dry mouth.  ? Peeing less.  ? Looking pale.  · You have very  bad pain in your belly (abdomen).  · You keep throwing up or having water poop.  · You have a skin rash.  · Your symptoms suddenly get worse.  This information is not intended to replace advice given to you by your health care provider. Make sure you discuss any questions you have with your health care provider.  Document Released: 09/26/2009 Document Revised: 08/01/2018 Document Reviewed: 02/11/2016  [employer] Interactive Patient Education © 2019 [employer]

## 2019-09-09 NOTE — PROGRESS NOTES
Generalized Body Aches; Fever; and Sinus Problem (PRESSURE)      Subjective   Nany Guerrero is a 34 y.o. female.     Fever    This is a new problem. The current episode started today. The problem occurs constantly. The problem has been gradually improving. The maximum temperature noted was 101 to 101.9 F. The temperature was taken using an oral thermometer. Associated symptoms include congestion and muscle aches. Pertinent negatives include no abdominal pain, chest pain, coughing, diarrhea, ear pain, headaches, nausea, rash, sleepiness, sore throat, urinary pain, vomiting or wheezing. She has tried acetaminophen and NSAIDs for the symptoms. The treatment provided moderate relief.   Risk factors: no contaminated food, no contaminated water, no hx of cancer, no immunosuppression, no occupational exposure, no recent sickness, no recent travel and no sick contacts       Patient reports onset of fever, body aches, sinus pressure, and runny nose that started today. She states fever was 101.8 this afternoon. She has been taking Tylenol Cold and Flu, Ibuprofen, and Flonase for her symptoms.    There is no problem list on file for this patient.      Allergies   Allergen Reactions   • Sulfa Antibiotics         Current Outpatient Medications on File Prior to Visit   Medication Sig Dispense Refill   • [DISCONTINUED] albuterol (PROVENTIL HFA;VENTOLIN HFA) 108 (90 Base) MCG/ACT inhaler Inhale 2 puffs Every 6 (Six) Hours As Needed for Wheezing or Shortness of Air. 1 inhaler 2   • [DISCONTINUED] cyclobenzaprine (FLEXERIL) 10 MG tablet Take 1 tablet by mouth 3 (Three) Times a Day As Needed for Muscle Spasms. 30 tablet 1   • [DISCONTINUED] diclofenac (VOLTAREN) 50 MG EC tablet Take 1 tablet by mouth 2 (Two) Times a Day. 60 tablet 2     No current facility-administered medications on file prior to visit.        Past Medical History:   Diagnosis Date   • Asthma    • Obesity        Past Surgical History:   Procedure Laterality Date    •  SECTION     • CHOLECYSTECTOMY         Family History   Problem Relation Age of Onset   • Asthma Mother    • No Known Problems Father        Social History     Socioeconomic History   • Marital status:      Spouse name: Not on file   • Number of children: Not on file   • Years of education: Not on file   • Highest education level: Not on file   Tobacco Use   • Smoking status: Never Smoker   • Smokeless tobacco: Never Used   Substance and Sexual Activity   • Alcohol use: No   • Drug use: No   • Sexual activity: Yes     Comment:        Travel:  No recent travel within the last 21 days outside the [place] Denies recent travel to one of the following [place]ries:  [place], [place], [place], or [place].  Denies contact with anyone who has traveled to one of the following [place]  Countries: [place], [place], [place], or [place] within the last 21 days and is known or suspected to have Ebola.  Denies having had any contact with the human remains, blood or any bodily fluids of someone who is known or suspected to have Ebola within the last 21 days.     OB History     No data available          Review of Systems   Constitutional: Positive for fatigue and fever. Negative for chills and diaphoresis.   HENT: Positive for congestion, rhinorrhea, sinus pressure and sneezing. Negative for ear discharge, ear pain, postnasal drip, sinus pain, sore throat, tinnitus, trouble swallowing and voice change.    Respiratory: Negative for cough, chest tightness, shortness of breath and wheezing.    Cardiovascular: Negative for chest pain.   Gastrointestinal: Negative for abdominal pain, diarrhea, nausea and vomiting.   Genitourinary: Negative for dysuria.   Skin: Negative for rash.   Neurological: Negative for headaches.   All other systems reviewed and are negative.      /92 (BP Location: Left arm, Patient Position: Sitting, Cuff Size: Adult)   Pulse (!) 138   Temp (!) 100.7 °F (38.2 °C)    "Resp 18   Ht 167.6 cm (66\")   Wt 119 kg (262 lb 12.8 oz)   LMP 09/04/2019   SpO2 96%   Breastfeeding? No   BMI 42.42 kg/m²     Objective   Physical Exam   Constitutional: She is oriented to person, place, and time. She appears well-developed and well-nourished. No distress.   HENT:   Head: Normocephalic and atraumatic.   Right Ear: Hearing, external ear and ear canal normal. Tympanic membrane is injected and erythematous.   Left Ear: Hearing, external ear and ear canal normal. Tympanic membrane is injected and erythematous.   Nose: Mucosal edema and rhinorrhea present. Right sinus exhibits no maxillary sinus tenderness and no frontal sinus tenderness. Left sinus exhibits no maxillary sinus tenderness and no frontal sinus tenderness.   Mouth/Throat: Uvula is midline and mucous membranes are normal. Posterior oropharyngeal erythema present. No oropharyngeal exudate, posterior oropharyngeal edema or tonsillar abscesses.   Eyes: Conjunctivae and EOM are normal. Pupils are equal, round, and reactive to light. Right eye exhibits no discharge. Left eye exhibits no discharge. No scleral icterus.   Neck: Normal range of motion. Neck supple.   Cardiovascular: Regular rhythm and normal heart sounds. Tachycardia present.   Pulmonary/Chest: Effort normal and breath sounds normal. No tachypnea. No respiratory distress. She has no decreased breath sounds. She has no wheezes. She has no rhonchi. She has no rales.   Abdominal: Soft. Bowel sounds are normal.   Musculoskeletal: Normal range of motion.   Lymphadenopathy:     She has no cervical adenopathy.   Neurological: She is alert and oriented to person, place, and time.   Skin: Skin is warm. No rash noted. She is not diaphoretic.   Psychiatric: She has a normal mood and affect. Her behavior is normal.   Vitals reviewed.      Assessment/Plan   Nany was seen today for generalized body aches, fever and sinus problem.    Diagnoses and all orders for this visit:    Flu-like " symptoms  -     POCT Influenza A/B    Viral illness    Fever, unspecified fever cause      Plan of care discussed with patient: rest; increase fluid intake; Ibuprofen for pain/fever; continue Flonase and Tylenol Cold and Flu for symptom management; follow up with PCP for no improvement in 2-3 days, go to ER/UTC for new or worsening symptoms. Verbalized understanding.     Results for orders placed or performed in visit on 09/09/19   POCT Influenza A/B   Result Value Ref Range    Rapid Influenza A Ag Negative Negative    Rapid Influenza B Ag Negative Negative    Internal Control Passed Passed    Lot Number [id number]     Expiration Date 04/30/21        No Follow-up on file.

## 2019-12-23 ENCOUNTER — OFFICE VISIT (OUTPATIENT)
Dept: RETAIL CLINIC | Facility: CLINIC | Age: 34
End: 2019-12-23

## 2019-12-23 VITALS
HEIGHT: 66 IN | HEART RATE: 104 BPM | DIASTOLIC BLOOD PRESSURE: 80 MMHG | TEMPERATURE: 98.4 F | SYSTOLIC BLOOD PRESSURE: 110 MMHG | BODY MASS INDEX: 38.57 KG/M2 | WEIGHT: 240 LBS | RESPIRATION RATE: 20 BRPM | OXYGEN SATURATION: 99 %

## 2019-12-23 DIAGNOSIS — J06.9 UPPER RESPIRATORY TRACT INFECTION, UNSPECIFIED TYPE: Primary | ICD-10-CM

## 2019-12-23 PROCEDURE — 99213 OFFICE O/P EST LOW 20 MIN: CPT | Performed by: NURSE PRACTITIONER

## 2019-12-23 RX ORDER — PSEUDOEPHEDRINE HCL 120 MG/1
120 TABLET, FILM COATED, EXTENDED RELEASE ORAL EVERY 12 HOURS
Qty: 20 TABLET | Refills: 0 | OUTPATIENT
Start: 2019-12-23 | End: 2021-08-31

## 2019-12-23 RX ORDER — ALBUTEROL SULFATE 90 UG/1
2 AEROSOL, METERED RESPIRATORY (INHALATION) EVERY 4 HOURS PRN
Qty: 1 INHALER | Refills: 1 | OUTPATIENT
Start: 2019-12-23 | End: 2021-08-31

## 2019-12-23 RX ORDER — GUAIFENESIN AND DEXTROMETHORPHAN HYDROBROMIDE 600; 30 MG/1; MG/1
1 TABLET, EXTENDED RELEASE ORAL EVERY 12 HOURS SCHEDULED
Qty: 20 TABLET | Refills: 0 | OUTPATIENT
Start: 2019-12-23 | End: 2021-08-31

## 2019-12-23 NOTE — PATIENT INSTRUCTIONS
"Upper Respiratory Infection, Adult  An upper respiratory infection (URI) affects the nose, throat, and upper air passages. URIs are caused by germs (viruses). The most common type of URI is often called \"the common cold.\"  Medicines cannot cure URIs, but you can do things at home to relieve your symptoms. URIs usually get better within 7-10 days.  Follow these instructions at home:  Activity  · Rest as needed.  · If you have a fever, stay home from work or school until your fever is gone, or until your doctor says you may return to work or school.  ? You should stay home until you cannot spread the infection anymore (you are not contagious).  ? Your doctor may have you wear a face mask so you have less risk of spreading the infection.  Relieving symptoms  · Gargle with a salt-water mixture 3-4 times a day or as needed. To make a salt-water mixture, completely dissolve ½-1 tsp of salt in 1 cup of warm water.  · Use a cool-mist humidifier to add moisture to the air. This can help you breathe more easily.  Eating and drinking    · Drink enough fluid to keep your pee (urine) pale yellow.  · Eat soups and other clear broths.  General instructions    · Take over-the-counter and prescription medicines only as told by your doctor. These include cold medicines, fever reducers, and cough suppressants.  · Do not use any products that contain nicotine or tobacco. These include cigarettes and e-cigarettes. If you need help quitting, ask your doctor.  · Avoid being where people are smoking (avoid secondhand smoke).  · Make sure you get regular shots and get the flu shot every year.  · Keep all follow-up visits as told by your doctor. This is important.  How to avoid spreading infection to others    · Wash your hands often with soap and water. If you do not have soap and water, use hand .  · Avoid touching your mouth, face, eyes, or nose.  · Cough or sneeze into a tissue or your sleeve or elbow. Do not cough or sneeze " "into your hand or into the air.  Contact a doctor if:  · You are getting worse, not better.  · You have any of these:  ? A fever.  ? Chills.  ? Brown or red mucus in your nose.  ? Yellow or brown fluid (discharge)coming from your nose.  ? Pain in your face, especially when you bend forward.  ? Swollen neck glands.  ? Pain with swallowing.  ? White areas in the back of your throat.  Get help right away if:  · You have shortness of breath that gets worse.  · You have very bad or constant:  ? Headache.  ? Ear pain.  ? Pain in your forehead, behind your eyes, and over your cheekbones (sinus pain).  ? Chest pain.  · You have long-lasting (chronic) lung disease along with any of these:  ? Wheezing.  ? Long-lasting cough.  ? Coughing up blood.  ? A change in your usual mucus.  · You have a stiff neck.  · You have changes in your:  ? Vision.  ? Hearing.  ? Thinking.  ? Mood.  Summary  · An upper respiratory infection (URI) is caused by a germ called a virus. The most common type of URI is often called \"the common cold.\"  · URIs usually get better within 7-10 days.  · Take over-the-counter and prescription medicines only as told by your doctor.  This information is not intended to replace advice given to you by your health care provider. Make sure you discuss any questions you have with your health care provider.  Document Released: 06/05/2009 Document Revised: 08/10/2018 Document Reviewed: 08/10/2018  [employer] Interactive Patient Education © 2019 [employer]    "

## 2019-12-23 NOTE — PROGRESS NOTES
KIAN@  Nany Guerrero is a 34 y.o. female.   Chief Complaint   Patient presents with   • Cough   • Shortness of Breath      History of Present Illness   2 week h/o cough and congestion unrelieved with otc cough and cold medication. She has Asthma and is currently out of her Proventil inhaler. She has been short of air while going up stairs but has no wheezing. Her cough is non-productive and she is without fever.   The following portions of the patient's history were reviewed and updated as appropriate: allergies, current medications, past family history, past medical history, past social history, past surgical history and problem list.    Current Outpatient Medications:   •  albuterol sulfate  (90 Base) MCG/ACT inhaler, Inhale 2 puffs Every 4 (Four) Hours As Needed for Shortness of Air. With spacer please, Disp: 1 inhaler, Rfl: 1  •  guaifenesin-dextromethorphan (MUCINEX DM)  MG tablet sustained-release 12 hour tablet, Take 1 tablet by mouth Every 12 (Twelve) Hours., Disp: 20 tablet, Rfl: 0  •  pseudoephedrine (SUDAFED 12 HOUR) 120 MG 12 hr tablet, Take 1 tablet by mouth Every 12 (Twelve) Hours., Disp: 20 tablet, Rfl: 0    Allergies   Allergen Reactions   • Sulfa Antibiotics        Review of Systems   Constitutional: Positive for activity change. Negative for appetite change, chills, fatigue and fever.   HENT: Positive for congestion. Negative for ear discharge, ear pain, mouth sores, postnasal drip, rhinorrhea, sinus pressure, sinus pain, sore throat, trouble swallowing and voice change.    Respiratory: Positive for cough and shortness of breath. Negative for wheezing.    Cardiovascular: Negative.    Gastrointestinal: Negative.    Musculoskeletal: Negative.    Skin: Negative.    Allergic/Immunologic: Negative.    Neurological: Negative.    Hematological: Negative.    Psychiatric/Behavioral: Negative.        Objective     Visit Vitals  /80   Pulse 104   Temp 98.4 °F (36.9 °C)   Resp  "20   Ht 167.6 cm (66\")   Wt 109 kg (240 lb)   SpO2 99%   BMI 38.74 kg/m²         Physical Exam   Constitutional: She is oriented to person, place, and time. Vital signs are normal. She appears well-developed and well-nourished. She is cooperative.  Non-toxic appearance. She does not have a sickly appearance. She does not appear ill. No distress.   HENT:   Head: Normocephalic.   Right Ear: Tympanic membrane and external ear normal.   Left Ear: Tympanic membrane and external ear normal.   Nose: Mucosal edema present. No rhinorrhea. Right sinus exhibits no maxillary sinus tenderness and no frontal sinus tenderness. Left sinus exhibits no maxillary sinus tenderness and no frontal sinus tenderness.   Mouth/Throat: Oropharynx is clear and moist and mucous membranes are normal. No oropharyngeal exudate, posterior oropharyngeal edema or posterior oropharyngeal erythema. Tonsils are 0 on the right. Tonsils are 0 on the left.   Nasal turbinates pale and boggy with 1+ swelling without discharge.    Eyes: Conjunctivae are normal.   Neck: Normal range of motion. Neck supple.   Cardiovascular: Normal rate, regular rhythm and normal heart sounds.   No murmur heard.  Pulmonary/Chest: Effort normal and breath sounds normal. No respiratory distress. She has no wheezes.   Lymphadenopathy:     She has no cervical adenopathy.   Neurological: She is alert and oriented to person, place, and time.   Skin: Skin is warm and dry. No rash noted.   Psychiatric: She has a normal mood and affect.   Nursing note and vitals reviewed.      Lab Results (last 24 hours)     ** No results found for the last 24 hours. **          Assessment/Plan   Nany was seen today for cough and shortness of breath.    Diagnoses and all orders for this visit:    Upper respiratory tract infection, unspecified type  -     guaifenesin-dextromethorphan (MUCINEX DM)  MG tablet sustained-release 12 hour tablet; Take 1 tablet by mouth Every 12 (Twelve) Hours.  -     " albuterol sulfate  (90 Base) MCG/ACT inhaler; Inhale 2 puffs Every 4 (Four) Hours As Needed for Shortness of Air. With spacer please  -     pseudoephedrine (SUDAFED 12 HOUR) 120 MG 12 hr tablet; Take 1 tablet by mouth Every 12 (Twelve) Hours.    increase fluids and rest.   Follow up prn worsening s/s   LILLY Nash

## 2021-01-12 ENCOUNTER — IMMUNIZATION (OUTPATIENT)
Dept: VACCINE CLINIC | Facility: HOSPITAL | Age: 36
End: 2021-01-12

## 2021-01-12 PROCEDURE — 91300 HC SARSCOV02 VAC 30MCG/0.3ML IM: CPT | Performed by: INTERNAL MEDICINE

## 2021-01-12 PROCEDURE — 0001A: CPT | Performed by: INTERNAL MEDICINE

## 2021-02-02 ENCOUNTER — IMMUNIZATION (OUTPATIENT)
Dept: VACCINE CLINIC | Facility: HOSPITAL | Age: 36
End: 2021-02-02

## 2021-02-02 PROCEDURE — 91300 HC SARSCOV02 VAC 30MCG/0.3ML IM: CPT | Performed by: INTERNAL MEDICINE

## 2021-02-02 PROCEDURE — 0002A: CPT | Performed by: INTERNAL MEDICINE

## 2022-01-29 ENCOUNTER — TELEMEDICINE (OUTPATIENT)
Dept: FAMILY MEDICINE CLINIC | Facility: TELEHEALTH | Age: 37
End: 2022-01-29

## 2022-01-29 DIAGNOSIS — J06.9 VIRAL UPPER RESPIRATORY TRACT INFECTION: Primary | ICD-10-CM

## 2022-01-29 DIAGNOSIS — Z20.822 CLOSE EXPOSURE TO COVID-19 VIRUS: ICD-10-CM

## 2022-01-29 PROBLEM — J02.9 PHARYNGEAL INFLAMMATION: Status: ACTIVE | Noted: 2022-01-29

## 2022-01-29 PROBLEM — M65.939 TENOSYNOVITIS OF WRIST: Status: ACTIVE | Noted: 2022-01-29

## 2022-01-29 PROBLEM — A08.4 GASTROENTERITIS AND COLITIS, VIRAL: Status: ACTIVE | Noted: 2022-01-29

## 2022-01-29 PROBLEM — J01.90 ACUTE INFECTION OF NASAL SINUS: Status: ACTIVE | Noted: 2022-01-29

## 2022-01-29 PROBLEM — M65.9 TENOSYNOVITIS OF WRIST: Status: ACTIVE | Noted: 2022-01-29

## 2022-01-29 PROBLEM — M67.40 GANGLION OF TENDON: Status: ACTIVE | Noted: 2022-01-29

## 2022-01-29 LAB — SARS-COV-2 RNA NOSE QL NAA+PROBE: NOT DETECTED

## 2022-01-29 PROCEDURE — BHEMPVIDEOVISIT: Performed by: NURSE PRACTITIONER

## 2022-01-29 NOTE — PROGRESS NOTES
You have chosen to receive care through a telehealth visit.  Do you consent to use a video/audio connection for your medical care today? Yes     CHIEF COMPLAINT  Chief Complaint   Patient presents with   • Sore Throat         HPI  Nany Guerrero is a 36 y.o. female  presents with complaint of sore throat and congestion with recent exposure to COVID-19.     Review of Systems   Constitutional: Negative for chills, diaphoresis, fatigue and fever.   HENT: Positive for congestion, postnasal drip, rhinorrhea, sinus pressure and sore throat. Negative for sinus pain and sneezing.    Respiratory: Negative for cough, chest tightness, shortness of breath and wheezing.    Cardiovascular: Negative for chest pain.   Gastrointestinal: Negative for diarrhea, nausea and vomiting.   Musculoskeletal: Negative for myalgias.   Neurological: Positive for headaches.       Past Medical History:   Diagnosis Date   • Asthma    • Obesity        Family History   Problem Relation Age of Onset   • Asthma Mother    • No Known Problems Father        Social History     Socioeconomic History   • Marital status:    Tobacco Use   • Smoking status: Never Smoker   • Smokeless tobacco: Never Used   Substance and Sexual Activity   • Alcohol use: No   • Drug use: No   • Sexual activity: Yes     Comment:          There were no vitals taken for this visit.    PHYSICAL EXAM  Physical Exam   Constitutional: She is oriented to person, place, and time. She appears well-developed and well-nourished. She does not have a sickly appearance. She does not appear ill. No distress.   HENT:   Head: Normocephalic and atraumatic.   Eyes: EOM are normal.   Neck: Neck normal appearance.  Pulmonary/Chest: Effort normal.  No respiratory distress.  Neurological: She is alert and oriented to person, place, and time.   Skin: Skin is dry.   Psychiatric: She has a normal mood and affect.         Diagnoses and all orders for this visit:    1. Viral upper respiratory  tract infection (Primary)  -     COVID-19 PCR, [facility], NP SWAB IN [facility]AL TRANSPORT MEDIA/ORAL SWISH 24-30 HR TAT - Swab, Nasopharynx; Future  -     QUESTIONNAIRE SERIES    2. Close exposure to COVID-19 virus  -     COVID-19 PCR, [facility], NP SWAB IN [facility] V[facility]AL TRANSPORT MEDIA/ORAL SWISH 24-30 HR TAT - Swab, Nasopharynx; Future  -     QUESTIONNAIRE SERIES          FOLLOW-UP  As discussed during visit with PCP/[facility] if no improvement or Urgent Care/Emergency Department if worsening of symptoms    Patient verbalizes understanding of medication dosage, comfort measures, instructions for treatment and follow-up.    Laura Malhotra, APRN  01/29/2022  11:39 EST    This visit was performed via Telehealth.  This patient has been instructed to follow-up with their primary care provider if their symptoms worsen or the treatment provided does not resolve their illness.

## 2022-01-29 NOTE — PATIENT INSTRUCTIONS
You will need to upload your positive COVID-19 test  on [facility] at [email or web address]     I have included a COVID-19 test. Go to your nearest [facility] for testing. Tell them you have already been seen virtually and only need testing   We will notify you of your COVID-19 results by phone. You will receive a call from an unknown or blocked number. You can also get your results on your [employer] darin.    You will need to remain quarantined for 10 days from onset of symptoms and only to discontinue if symptoms have improved and no fever for 24 hours without the use of fever reducing medications such as Tylenol (acetaminophen), Advil (ibuprfen), or Aleve (naproxen). You may discontinue after 5 days if your symptoms have resolved and you have no fever for 24 hours without the use of fever reducing medications such as Tylenol (acetaminophen), Advil (ibuprfen), or Aleve (naproxen). You will need to repeat the home antigen test supplied by [facility] and it must be negative to return to work earlier than 10 days.   Continue to wear a mask for 10 days or until symptoms resolve    Symptoms of Coronavirus are treated just as you would for any viral illness. Take Tylenol and/or Ibuprofen for pain and/or fever, you may find it better to alternate these every 4 hours, so that you are only taking each every 8 hours. Stay hydrated, rest and you may treat cough with over-the-counter cough syrup such as Robitussin.  If your symptoms do not improve, symptoms change or do not fully resolve, seek further evaluation with your primary care provider or Urgent Care.     If you develop severe symptoms, such as chest pain, high fever, difficulty breathing, difficulty urinating, confusion, difficulty staying awake, blue or pale lips or have any symptoms that interfere with your ability to function go to the nearest Emergency Department immediately.

## 2022-01-31 ENCOUNTER — LAB (OUTPATIENT)
Dept: LAB | Facility: HOSPITAL | Age: 37
End: 2022-01-31

## 2022-01-31 DIAGNOSIS — Z20.822 CLOSE EXPOSURE TO COVID-19 VIRUS: ICD-10-CM

## 2022-01-31 DIAGNOSIS — J06.9 VIRAL UPPER RESPIRATORY TRACT INFECTION: ICD-10-CM

## 2022-01-31 PROCEDURE — U0004 COV-19 TEST NON-CDC HGH THRU: HCPCS

## 2024-06-18 ENCOUNTER — OFFICE VISIT (OUTPATIENT)
Dept: FAMILY MEDICINE CLINIC | Facility: CLINIC | Age: 39
End: 2024-06-18
Payer: COMMERCIAL

## 2024-06-18 VITALS
DIASTOLIC BLOOD PRESSURE: 84 MMHG | HEART RATE: 93 BPM | WEIGHT: 293 LBS | BODY MASS INDEX: 47.09 KG/M2 | HEIGHT: 66 IN | SYSTOLIC BLOOD PRESSURE: 136 MMHG | TEMPERATURE: 98.6 F

## 2024-06-18 DIAGNOSIS — Z00.00 ENCOUNTER FOR ANNUAL PHYSICAL EXAM: Primary | ICD-10-CM

## 2024-06-18 DIAGNOSIS — M62.838 MUSCLE SPASM: ICD-10-CM

## 2024-06-18 DIAGNOSIS — M54.2 CERVICALGIA: ICD-10-CM

## 2024-06-18 DIAGNOSIS — H65.02 NON-RECURRENT ACUTE SEROUS OTITIS MEDIA OF LEFT EAR: ICD-10-CM

## 2024-06-18 PROCEDURE — 99385 PREV VISIT NEW AGE 18-39: CPT | Performed by: FAMILY MEDICINE

## 2024-06-18 RX ORDER — MELOXICAM 15 MG/1
15 TABLET ORAL DAILY
Qty: 30 TABLET | Refills: 2 | Status: SHIPPED | OUTPATIENT
Start: 2024-06-18

## 2024-06-18 RX ORDER — FLUTICASONE PROPIONATE 50 MCG
2 SPRAY, SUSPENSION (ML) NASAL DAILY
Qty: 15.8 ML | Refills: 5 | Status: SHIPPED | OUTPATIENT
Start: 2024-06-18

## 2024-06-18 RX ORDER — BACLOFEN 10 MG/1
10 TABLET ORAL 2 TIMES DAILY PRN
Qty: 60 TABLET | Refills: 2 | Status: SHIPPED | OUTPATIENT
Start: 2024-06-18

## 2024-06-18 NOTE — PROGRESS NOTES
Subjective     Chief Complaint  nerve pain  (R shoulder. Has had for a while and is bothering her again)    Subjective          Nany Guerrero is a 39 y.o. female who presents today to [facility] for follow up.    HPI:   History of Present Illness    Ms. Guerrero is a very pleasant 39 year old female today to establish care with a primary care physician.     She does not have any chronic medical conditions.  She does not take any chronic medications.    Acutely she has right-sided muscle spasm and pain.  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  She does not have any history of trauma.  She states that if she paints or plays with her son etc. sometimes she will get a flare of the pain.  Is been bothering her increasingly lately.  Additionally, she has left ear irritation and itching at times and fullness.    The following portions of the patient's history were reviewed and updated as appropriate: allergies, current medications, past family history, past medical history, past social history, past surgical history and problem list.    Objective     Objective     Allergy:   Allergies   Allergen Reactions    Sulfa Antibiotics Rash        Current Medications:   Current Outpatient Medications   Medication Sig Dispense Refill    baclofen (LIORESAL) 10 MG tablet Take 1 tablet by mouth 2 (Two) Times a Day As Needed for Muscle Spasms. 60 tablet 2    fluticasone (FLONASE) 50 MCG/ACT nasal spray 2 sprays into the nostril(s) as directed by provider Daily. 15.8 mL 5    meloxicam (Mobic) 15 MG tablet Take 1 tablet by mouth Daily. 30 tablet 2     No current facility-administered medications for this visit.       Past Medical History:  Past Medical History:   Diagnosis Date    Allergic     Asthma     Obesity        Past Surgical History:  Past Surgical History:   Procedure Laterality Date     SECTION      CHOLECYSTECTOMY         Social  "History:  Social History     Socioeconomic History    Marital status:    Tobacco Use    Smoking status: Never     Passive exposure: Never    Smokeless tobacco: Never   Vaping Use    Vaping status: Never Used   Substance and Sexual Activity    Alcohol use: Never    Drug use: Never    Sexual activity: Yes     Partners: Male     Comment:        Family History:  Family History   Problem Relation Age of Onset    Asthma Mother     Hyperlipidemia Father     Vision loss Maternal Grandmother         Macular Degeneratio.       Vital Signs:   /84   Pulse 93   Temp 98.6 °F (37 °C) (Infrared)   Ht 167.6 cm (65.98\")   Wt (!) 145 kg (319 lb 6.4 oz)   BMI 51.58 kg/m²      Physical Exam:  Physical Exam  Vitals reviewed.   Constitutional:       Appearance: She is not ill-appearing.   HENT:      Left Ear: A middle ear effusion is present.   Cardiovascular:      Rate and Rhythm: Normal rate.      Pulses: Normal pulses.   Pulmonary:      Effort: Pulmonary effort is normal.      Breath sounds: Normal breath sounds.   Musculoskeletal:      Cervical back: Spasms present. Decreased range of motion.   Neurological:      General: No focal deficit present.      Mental Status: She is alert. Mental status is at baseline.   Psychiatric:         Mood and Affect: Mood normal.         Behavior: Behavior normal.         Thought Content: Thought content normal.         Judgment: Judgment normal.               PHQ-9 Score  PHQ-9 Total Score: 0     Lab Review  No visits with results within 3 Month(s) from this visit.   Latest known visit with results is:   Lab on 01/31/2022   Component Date Value Ref Range Status    SARS-CoV-2 CHER 01/31/2022 Not Detected  Not Detected Final        Radiology Results        Assessment / Plan         Assessment and Plan   Diagnoses and all orders for this visit:    1. Encounter for annual physical exam (Primary)  Assessment & Plan:  Annual wellness exam completed today. Health Maintenance including " immunizations was updated and reflected in the chart. Yearly screening labs were ordered.     Further recommendations to be given once lab data received.     Health advice: healthy food choices with fresh fruits and vegetables, maintain sleep pattern at least 8 hours, avoid texting and distracted driving practices; wear safety belt, engage in regular exercise, maintain healthy weight, use safe sex practices, avoid alcohol and illicit drugs. Maintain immunizations that are up to date. Maintain health maintenance:PAP, etc.  Follow up with PCP if struggling with depression or anxiety. Keep regular dental and eye exams. Brush and floss teeth daily.     I suggest they take a daily multivitamin that is age-appropriate (example women's One-A-Day.)  Patient voiced understanding of these instructions.     Follow up Annually for Physical       Orders:  -     CBC & Differential; Future  -     Comprehensive Metabolic Panel; Future  -     Hemoglobin A1c; Future  -     Lipid Panel; Future  -     TSH Rfx On Abnormal To Free T4; Future  -     Vitamin D,25-Hydroxy; Future  -     Vitamin B12; Future    2. Cervicalgia  Assessment & Plan:  Is likely muscle spasm.  Referring to physical therapy for treatment and management.  Also treating with NSAIDs and muscle relaxant for pain relief.    Orders:  -     baclofen (LIORESAL) 10 MG tablet; Take 1 tablet by mouth 2 (Two) Times a Day As Needed for Muscle Spasms.  Dispense: 60 tablet; Refill: 2  -     meloxicam (Mobic) 15 MG tablet; Take 1 tablet by mouth Daily.  Dispense: 30 tablet; Refill: 2  -     Ambulatory Referral to Physical Therapy    3. Muscle spasm  -     baclofen (LIORESAL) 10 MG tablet; Take 1 tablet by mouth 2 (Two) Times a Day As Needed for Muscle Spasms.  Dispense: 60 tablet; Refill: 2  -     meloxicam (Mobic) 15 MG tablet; Take 1 tablet by mouth Daily.  Dispense: 30 tablet; Refill: 2  -     Ambulatory Referral to Physical Therapy    4. Non-recurrent acute serous otitis media  of left ear  -     fluticasone (FLONASE) 50 MCG/ACT nasal spray; 2 sprays into the nostril(s) as directed by provider Daily.  Dispense: 15.8 mL; Refill: 5        Discussed possible differential diagnoses, testing, treatment, recommended non-pharmacological interventions, risks, warning signs to monitor for that would indicate need for follow-up in clinic or ER. If no improvement with these regimens or you have new or worsening symptoms follow-up. Patient verbalizes understanding and agreement with plan of care. Denies further needs or concerns.     Patient was given instructions and counseling regarding her condition and for health maintenance advised.    Class 3 Severe Obesity (BMI >=40). Obesity-related health conditions include the following: none. Obesity is unchanged. BMI is is above average; BMI management plan is completed. We discussed Information on healthy weight added to patient's after visit summary.             Health Maintenance  Health Maintenance:   Health Maintenance Due   Topic Date Due    HEPATITIS C SCREENING  Never done        Meds ordered during this visit  New Medications Ordered This Visit   Medications    baclofen (LIORESAL) 10 MG tablet     Sig: Take 1 tablet by mouth 2 (Two) Times a Day As Needed for Muscle Spasms.     Dispense:  60 tablet     Refill:  2    meloxicam (Mobic) 15 MG tablet     Sig: Take 1 tablet by mouth Daily.     Dispense:  30 tablet     Refill:  2    fluticasone (FLONASE) 50 MCG/ACT nasal spray     Si sprays into the nostril(s) as directed by provider Daily.     Dispense:  15.8 mL     Refill:  5       Meds stopped during this visit:  There are no discontinued medications.     Visit Diagnoses    ICD-10-CM ICD-9-CM   1. Encounter for annual physical exam  Z00.00 V70.0   2. Cervicalgia  M54.2 723.1   3. Muscle spasm  M62.838 728.85   4. Non-recurrent acute serous otitis media of left ear  H65.02 381.01       Patient was given instructions and counseling regarding her  condition or for health maintenance advice. Please see specific information pulled into the AVS if appropriate.     Follow Up   Return in about 1 year (around 6/18/2025) for followup, Annual.      This document has been electronically signed by Sonal Wilson DO   June 18, 2024 16:09 EDT    Dictated Utilizing Dragon Dictation: Part of this note may be an electronic transcription/translation of spoken language to printed text using the Dragon Dictation System.    Sonal Wilson D.O.  [facility]

## 2024-06-18 NOTE — ASSESSMENT & PLAN NOTE
Annual wellness exam completed today. Health Maintenance including immunizations was updated and reflected in the chart. Yearly screening labs were ordered.     Further recommendations to be given once lab data received.     Health advice: healthy food choices with fresh fruits and vegetables, maintain sleep pattern at least 8 hours, avoid texting and distracted driving practices; wear safety belt, engage in regular exercise, maintain healthy weight, use safe sex practices, avoid alcohol and illicit drugs. Maintain immunizations that are up to date. Maintain health maintenance:PAP, etc.  Follow up with PCP if struggling with depression or anxiety. Keep regular dental and eye exams. Brush and floss teeth daily.     I suggest they take a daily multivitamin that is age-appropriate (example women's One-A-Day.)  Patient voiced understanding of these instructions.     Follow up Annually for Physical

## 2024-06-18 NOTE — ASSESSMENT & PLAN NOTE
Is likely muscle spasm.  Referring to physical therapy for treatment and management.  Also treating with NSAIDs and muscle relaxant for pain relief.

## 2024-07-29 ENCOUNTER — LAB (OUTPATIENT)
Dept: LAB | Facility: HOSPITAL | Age: 39
End: 2024-07-29
Payer: COMMERCIAL

## 2024-07-29 DIAGNOSIS — Z00.00 ENCOUNTER FOR ANNUAL PHYSICAL EXAM: ICD-10-CM

## 2024-07-29 LAB
25(OH)D3 SERPL-MCNC: 24.4 NG/ML (ref 30–100)
ALBUMIN SERPL-MCNC: 4.1 G/DL (ref 3.5–5.2)
ALBUMIN/GLOB SERPL: 1.4 G/DL
ALP SERPL-CCNC: 55 U/L (ref 39–117)
ALT SERPL W P-5'-P-CCNC: 23 U/L (ref 1–33)
ANION GAP SERPL CALCULATED.3IONS-SCNC: 8.5 MMOL/L (ref 5–15)
AST SERPL-CCNC: 18 U/L (ref 1–32)
BASOPHILS # BLD AUTO: 0.05 10*3/MM3 (ref 0–0.2)
BASOPHILS NFR BLD AUTO: 0.7 % (ref 0–1.5)
BILIRUB SERPL-MCNC: 0.2 MG/DL (ref 0–1.2)
BUN SERPL-MCNC: 11 MG/DL (ref 6–20)
BUN/CREAT SERPL: 14.5 (ref 7–25)
CALCIUM SPEC-SCNC: 9 MG/DL (ref 8.6–10.5)
CHLORIDE SERPL-SCNC: 107 MMOL/L (ref 98–107)
CHOLEST SERPL-MCNC: 153 MG/DL (ref 0–200)
CO2 SERPL-SCNC: 23.5 MMOL/L (ref 22–29)
CREAT SERPL-MCNC: 0.76 MG/DL (ref 0.57–1)
DEPRECATED RDW RBC AUTO: 40.4 FL (ref 37–54)
EGFRCR SERPLBLD CKD-EPI 2021: 102.4 ML/MIN/1.73
EOSINOPHIL # BLD AUTO: 0.27 10*3/MM3 (ref 0–0.4)
EOSINOPHIL NFR BLD AUTO: 4 % (ref 0.3–6.2)
ERYTHROCYTE [DISTWIDTH] IN BLOOD BY AUTOMATED COUNT: 12.8 % (ref 12.3–15.4)
GLOBULIN UR ELPH-MCNC: 3 GM/DL
GLUCOSE SERPL-MCNC: 98 MG/DL (ref 65–99)
HBA1C MFR BLD: 5.2 % (ref 4.8–5.6)
HCT VFR BLD AUTO: 40.4 % (ref 34–46.6)
HDLC SERPL-MCNC: 43 MG/DL (ref 40–60)
HGB BLD-MCNC: 13 G/DL (ref 12–15.9)
IMM GRANULOCYTES # BLD AUTO: 0.02 10*3/MM3 (ref 0–0.05)
IMM GRANULOCYTES NFR BLD AUTO: 0.3 % (ref 0–0.5)
LDLC SERPL CALC-MCNC: 97 MG/DL (ref 0–100)
LDLC/HDLC SERPL: 2.25 {RATIO}
LYMPHOCYTES # BLD AUTO: 2.3 10*3/MM3 (ref 0.7–3.1)
LYMPHOCYTES NFR BLD AUTO: 34.3 % (ref 19.6–45.3)
MCH RBC QN AUTO: 27.8 PG (ref 26.6–33)
MCHC RBC AUTO-ENTMCNC: 32.2 G/DL (ref 31.5–35.7)
MCV RBC AUTO: 86.3 FL (ref 79–97)
MONOCYTES # BLD AUTO: 0.65 10*3/MM3 (ref 0.1–0.9)
MONOCYTES NFR BLD AUTO: 9.7 % (ref 5–12)
NEUTROPHILS NFR BLD AUTO: 3.42 10*3/MM3 (ref 1.7–7)
NEUTROPHILS NFR BLD AUTO: 51 % (ref 42.7–76)
NRBC BLD AUTO-RTO: 0 /100 WBC (ref 0–0.2)
PLATELET # BLD AUTO: 295 10*3/MM3 (ref 140–450)
PMV BLD AUTO: 10.6 FL (ref 6–12)
POTASSIUM SERPL-SCNC: 4.5 MMOL/L (ref 3.5–5.2)
PROT SERPL-MCNC: 7.1 G/DL (ref 6–8.5)
RBC # BLD AUTO: 4.68 10*6/MM3 (ref 3.77–5.28)
SODIUM SERPL-SCNC: 139 MMOL/L (ref 136–145)
TRIGL SERPL-MCNC: 67 MG/DL (ref 0–150)
TSH SERPL DL<=0.05 MIU/L-ACNC: 1.87 UIU/ML (ref 0.27–4.2)
VIT B12 BLD-MCNC: 482 PG/ML (ref 211–946)
VLDLC SERPL-MCNC: 13 MG/DL (ref 5–40)
WBC NRBC COR # BLD AUTO: 6.71 10*3/MM3 (ref 3.4–10.8)

## 2024-07-29 PROCEDURE — 83036 HEMOGLOBIN GLYCOSYLATED A1C: CPT

## 2024-07-29 PROCEDURE — 80050 GENERAL HEALTH PANEL: CPT

## 2024-07-29 PROCEDURE — 82306 VITAMIN D 25 HYDROXY: CPT

## 2024-07-29 PROCEDURE — 80061 LIPID PANEL: CPT

## 2024-07-29 PROCEDURE — 82607 VITAMIN B-12: CPT

## 2024-12-06 ENCOUNTER — FLU SHOT (OUTPATIENT)
Dept: FAMILY MEDICINE CLINIC | Facility: CLINIC | Age: 39
End: 2024-12-06
Payer: COMMERCIAL

## 2024-12-06 DIAGNOSIS — Z23 IMMUNIZATION DUE: Primary | ICD-10-CM

## 2024-12-06 PROCEDURE — 90471 IMMUNIZATION ADMIN: CPT | Performed by: FAMILY MEDICINE

## 2024-12-06 PROCEDURE — 90656 IIV3 VACC NO PRSV 0.5 ML IM: CPT | Performed by: FAMILY MEDICINE

## 2025-06-20 ENCOUNTER — OFFICE VISIT (OUTPATIENT)
Dept: FAMILY MEDICINE CLINIC | Facility: CLINIC | Age: 40
End: 2025-06-20
Payer: COMMERCIAL

## 2025-06-20 VITALS
HEART RATE: 88 BPM | TEMPERATURE: 98.6 F | SYSTOLIC BLOOD PRESSURE: 136 MMHG | HEIGHT: 66 IN | DIASTOLIC BLOOD PRESSURE: 80 MMHG | BODY MASS INDEX: 47.09 KG/M2 | WEIGHT: 293 LBS

## 2025-06-20 DIAGNOSIS — E66.01 CLASS 3 SEVERE OBESITY WITHOUT SERIOUS COMORBIDITY WITH BODY MASS INDEX (BMI) OF 50.0 TO 59.9 IN ADULT, UNSPECIFIED OBESITY TYPE: ICD-10-CM

## 2025-06-20 DIAGNOSIS — Z23 NEED FOR VACCINATION: ICD-10-CM

## 2025-06-20 DIAGNOSIS — Z00.00 ENCOUNTER FOR ANNUAL PHYSICAL EXAM: Primary | ICD-10-CM

## 2025-06-20 DIAGNOSIS — Z12.31 ENCOUNTER FOR SCREENING MAMMOGRAM FOR MALIGNANT NEOPLASM OF BREAST: ICD-10-CM

## 2025-06-20 DIAGNOSIS — Z11.59 NEED FOR HEPATITIS C SCREENING TEST: ICD-10-CM

## 2025-06-20 DIAGNOSIS — F41.8 SITUATIONAL ANXIETY: ICD-10-CM

## 2025-06-20 DIAGNOSIS — E55.9 VITAMIN D DEFICIENCY: ICD-10-CM

## 2025-06-20 DIAGNOSIS — E66.813 CLASS 3 SEVERE OBESITY WITHOUT SERIOUS COMORBIDITY WITH BODY MASS INDEX (BMI) OF 50.0 TO 59.9 IN ADULT, UNSPECIFIED OBESITY TYPE: ICD-10-CM

## 2025-06-20 RX ORDER — BUSPIRONE HYDROCHLORIDE 5 MG/1
5 TABLET ORAL 3 TIMES DAILY
Qty: 90 TABLET | Refills: 5 | Status: SHIPPED | OUTPATIENT
Start: 2025-06-20

## 2025-06-20 NOTE — PROGRESS NOTES
Subjective     Chief Complaint  Annual Exam    Subjective          Nany Guerrero is a 40 y.o. female who presents today to [facility] for follow up.    HPI:   History of Present Illness    History of Present Illness  The patient presents today for an annual physical.    She reports no significant changes in her health status since her last visit. She has been making efforts to improve her diet and incorporate regular exercise into her routine. She experiences occasional irritability, particularly when faced with multiple simultaneous tasks or under time pressure. This irritability is not constant and does not seem to be associated with her menstrual cycle. She does not report any feelings of anxiety outside of these situations. She has not previously sought treatment for this issue and is interested in exploring potential therapeutic options.    Her last Pap smear was conducted 3 years ago, with a subsequent one scheduled for the previous year that was unfortunately canceled.      The following portions of the patient's history were reviewed and updated as appropriate: allergies, current medications, past family history, past medical history, past social history, past surgical history and problem list.    Objective     Objective     Allergy:   Allergies   Allergen Reactions    Sulfa Antibiotics Rash        Current Medications:   Current Outpatient Medications   Medication Sig Dispense Refill    fluticasone (FLONASE) 50 MCG/ACT nasal spray 2 sprays into the nostril(s) as directed by provider Daily. 15.8 mL 5    busPIRone (BUSPAR) 5 MG tablet Take 1 tablet by mouth 3 (Three) Times a Day. 90 tablet 5     No current facility-administered medications for this visit.       Past Medical History:  Past Medical History:   Diagnosis Date    Allergic     Asthma     Obesity        Past Surgical History:  Past Surgical History:   Procedure Laterality Date     SECTION       "CHOLECYSTECTOMY         Social History:  Social History     Socioeconomic History    Marital status:    Tobacco Use    Smoking status: Never     Passive exposure: Never    Smokeless tobacco: Never   Vaping Use    Vaping status: Never Used   Substance and Sexual Activity    Alcohol use: Never    Drug use: Never    Sexual activity: Yes     Partners: Male     Comment:        Family History:  Family History   Problem Relation Age of Onset    Asthma Mother     Hyperlipidemia Father     Vision loss Maternal Grandmother         Macular Degeneratio.         Vital Signs:   /80   Pulse 88   Temp 98.6 °F (37 °C) (Infrared)   Ht 167.6 cm (65.98\")   Wt (!) 147 kg (324 lb)   BMI 52.32 kg/m²      Physical Exam:  Physical Exam  Vitals reviewed.   Constitutional:       Appearance: She is not ill-appearing.   Eyes:      Pupils: Pupils are equal, round, and reactive to light.   Cardiovascular:      Rate and Rhythm: Normal rate.      Pulses: Normal pulses.   Pulmonary:      Effort: Pulmonary effort is normal.      Breath sounds: Normal breath sounds.   Neurological:      General: No focal deficit present.      Mental Status: She is alert. Mental status is at baseline.   Psychiatric:         Mood and Affect: Mood normal.         Behavior: Behavior normal.         Thought Content: Thought content normal.         Judgment: Judgment normal.               PHQ-9 Score  PHQ-9 Total Score:      Lab Review  No visits with results within 3 Month(s) from this visit.   Latest known visit with results is:   Lab on 07/29/2024   Component Date Value Ref Range Status    Glucose 07/29/2024 98  65 - 99 mg/dL Final    BUN 07/29/2024 11  6 - 20 mg/dL Final    Creatinine 07/29/2024 0.76  0.57 - 1.00 mg/dL Final    Sodium 07/29/2024 139  136 - 145 mmol/L Final    Potassium 07/29/2024 4.5  3.5 - 5.2 mmol/L Final    Chloride 07/29/2024 107  98 - 107 mmol/L Final    CO2 07/29/2024 23.5  22.0 - 29.0 mmol/L Final    Calcium 07/29/2024 " 9.0  8.6 - 10.5 mg/dL Final    Total Protein 07/29/2024 7.1  6.0 - 8.5 g/dL Final    Albumin 07/29/2024 4.1  3.5 - 5.2 g/dL Final    ALT (SGPT) 07/29/2024 23  1 - 33 U/L Final    AST (SGOT) 07/29/2024 18  1 - 32 U/L Final    Alkaline Phosphatase 07/29/2024 55  39 - 117 U/L Final    Total Bilirubin 07/29/2024 0.2  0.0 - 1.2 mg/dL Final    Globulin 07/29/2024 3.0  gm/dL Final    A/G Ratio 07/29/2024 1.4  g/dL Final    BUN/Creatinine Ratio 07/29/2024 14.5  7.0 - 25.0 Final    Anion Gap 07/29/2024 8.5  5.0 - 15.0 mmol/L Final    eGFR 07/29/2024 102.4  >60.0 mL/min/1.73 Final    Hemoglobin A1C 07/29/2024 5.20  4.80 - 5.60 % Final    Total Cholesterol 07/29/2024 153  0 - 200 mg/dL Final    Triglycerides 07/29/2024 67  0 - 150 mg/dL Final    HDL Cholesterol 07/29/2024 43  40 - 60 mg/dL Final    LDL Cholesterol  07/29/2024 97  0 - 100 mg/dL Final    VLDL Cholesterol 07/29/2024 13  5 - 40 mg/dL Final    LDL/HDL Ratio 07/29/2024 2.25   Final    TSH 07/29/2024 1.870  0.270 - 4.200 uIU/mL Final    25 Hydroxy, Vitamin D 07/29/2024 24.4 (L)  30.0 - 100.0 ng/ml Final    Vitamin B-12 07/29/2024 482  211 - 946 pg/mL Final    WBC 07/29/2024 6.71  3.40 - 10.80 10*3/mm3 Final    RBC 07/29/2024 4.68  3.77 - 5.28 10*6/mm3 Final    Hemoglobin 07/29/2024 13.0  12.0 - 15.9 g/dL Final    Hematocrit 07/29/2024 40.4  34.0 - 46.6 % Final    MCV 07/29/2024 86.3  79.0 - 97.0 fL Final    MCH 07/29/2024 27.8  26.6 - 33.0 pg Final    MCHC 07/29/2024 32.2  31.5 - 35.7 g/dL Final    RDW 07/29/2024 12.8  12.3 - 15.4 % Final    RDW-SD 07/29/2024 40.4  37.0 - 54.0 fl Final    MPV 07/29/2024 10.6  6.0 - 12.0 fL Final    Platelets 07/29/2024 295  140 - 450 10*3/mm3 Final    Neutrophil % 07/29/2024 51.0  42.7 - 76.0 % Final    Lymphocyte % 07/29/2024 34.3  19.6 - 45.3 % Final    Monocyte % 07/29/2024 9.7  5.0 - 12.0 % Final    Eosinophil % 07/29/2024 4.0  0.3 - 6.2 % Final    Basophil % 07/29/2024 0.7  0.0 - 1.5 % Final    Immature Grans % 07/29/2024 0.3   0.0 - 0.5 % Final    Neutrophils, Absolute 07/29/2024 3.42  1.70 - 7.00 10*3/mm3 Final    Lymphocytes, Absolute 07/29/2024 2.30  0.70 - 3.10 10*3/mm3 Final    Monocytes, Absolute 07/29/2024 0.65  0.10 - 0.90 10*3/mm3 Final    Eosinophils, Absolute 07/29/2024 0.27  0.00 - 0.40 10*3/mm3 Final    Basophils, Absolute 07/29/2024 0.05  0.00 - 0.20 10*3/mm3 Final    Immature Grans, Absolute 07/29/2024 0.02  0.00 - 0.05 10*3/mm3 Final    nRBC 07/29/2024 0.0  0.0 - 0.2 /100 WBC Final        Radiology Results        Assessment / Plan         Assessment and Plan   Diagnoses and all orders for this visit:    1. Encounter for annual physical exam (Primary)  Assessment & Plan:  Annual wellness exam completed today. Health Maintenance including immunizations was updated and reflected in the chart. Yearly screening labs were ordered.     Further recommendations to be given once lab data received.     Health advice: healthy food choices with fresh fruits and vegetables, maintain sleep pattern at least 8 hours, avoid texting and distracted driving practices; wear safety belt, engage in regular exercise, maintain healthy weight, use safe sex practices, avoid alcohol and illicit drugs. Maintain immunizations that are up to date. Maintain health maintenance:PAP, etc.  Follow up with PCP if struggling with depression or anxiety. Keep regular dental and eye exams. Brush and floss teeth daily.     I suggest they take a daily multivitamin that is age-appropriate (example women's One-A-Day.)  Patient voiced understanding of these instructions.     Follow up Annually for Physical       Orders:  -     CBC & Differential; Future  -     Comprehensive Metabolic Panel; Future  -     Hemoglobin A1c; Future  -     Lipid Panel; Future  -     TSH Rfx On Abnormal To Free T4; Future  -     Vitamin B12; Future  -     Vitamin D,25-Hydroxy; Future    2. Need for hepatitis C screening test  -     Hepatitis C Antibody; Future    3. Encounter for screening  mammogram for malignant neoplasm of breast  -     Mammo Screening Digital Tomosynthesis Bilateral With CAD; Future    4. Need for vaccination  -     Tdap Vaccine Greater Than or Equal To 8yo IM    5. Vitamin D deficiency  -     Vitamin B12; Future  -     Vitamin D,25-Hydroxy; Future    6. Class 3 severe obesity without serious comorbidity with body mass index (BMI) of 50.0 to 59.9 in adult, unspecified obesity type    7. Situational anxiety  -     busPIRone (BUSPAR) 5 MG tablet; Take 1 tablet by mouth 3 (Three) Times a Day.  Dispense: 90 tablet; Refill: 5        Assessment & Plan  1. Situational anxiety.  - Reports experiencing anxiety in specific situations, such as when managing multiple tasks or during her child's baseball games.  - Increased irritability during stressful events.  - BuSpar (buspirone) prescribed to be taken as needed, up to three times a day, approximately 30 minutes before anticipated stressful events or before bed if experiencing difficulty falling asleep due to anxiety.  - If BuSpar does not alleviate symptoms, contact the office for further management.    2. Health maintenance.  - Mammogram ordered; she will be contacted to schedule the appointment.  - Advised to reschedule Pap smear, last done 3 years ago.  - Due for Tdap vaccine, which will be administered today.  - Fasting labs ordered to monitor overall health status; encouraged to maintain adequate hydration, protein intake, and caloric balance, especially in conjunction with exercise regimen.      Discussed possible differential diagnoses, testing, treatment, recommended non-pharmacological interventions, risks, warning signs to monitor for that would indicate need for follow-up in clinic or ER. If no improvement with these regimens or you have new or worsening symptoms follow-up. Patient verbalizes understanding and agreement with plan of care. Denies further needs or concerns.     Patient was given instructions and counseling  regarding her condition and for health maintenance advised.    Class 3 Severe Obesity (BMI >=40). Obesity-related health conditions include the following: none. Obesity is unchanged. BMI is is above average; BMI management plan is completed. We discussed Information on healthy weight added to patient's after visit summary.           Health Maintenance  Health Maintenance:   Health Maintenance Due   Topic Date Due    TDAP/TD VACCINES (1 - Tdap) Never done    PAP SMEAR  Never done    HEPATITIS C SCREENING  Never done    MAMMOGRAM  Never done        Meds ordered during this visit  New Medications Ordered This Visit   Medications    busPIRone (BUSPAR) 5 MG tablet     Sig: Take 1 tablet by mouth 3 (Three) Times a Day.     Dispense:  90 tablet     Refill:  5       Meds stopped during this visit:  Medications Discontinued During This Encounter   Medication Reason    baclofen (LIORESAL) 10 MG tablet     meloxicam (Mobic) 15 MG tablet         Visit Diagnoses    ICD-10-CM ICD-9-CM   1. Encounter for annual physical exam  Z00.00 V70.0   2. Need for hepatitis C screening test  Z11.59 V73.89   3. Encounter for screening mammogram for malignant neoplasm of breast  Z12.31 V76.12   4. Need for vaccination  Z23 V05.9   5. Vitamin D deficiency  E55.9 268.9   6. Class 3 severe obesity without serious comorbidity with body mass index (BMI) of 50.0 to 59.9 in adult, unspecified obesity type  E66.813 278.01    Z68.43 V85.43    E66.01    7. Situational anxiety  F41.8 300.09       Patient was given instructions and counseling regarding her condition or for health maintenance advice. Please see specific information pulled into the AVS if appropriate.     Follow Up   Return in about 1 year (around 6/20/2026) for Annual.      Patient or patient representative verbalized consent for the use of Ambient Listening during the visit with  Sonal Wilson DO for chart documentation. 6/20/2025  13:59 EDT      This document has been electronically  signed by Sonal Wilson DO   June 20, 2025 15:18 EDT    Dictated Utilizing Dragon Dictation: Part of this note may be an electronic transcription/translation of spoken language to printed text using the Dragon Dictation System.    Sonal Wilson D.O.  [facility] [place]

## 2025-06-20 NOTE — LETTER
[facility]  Vaccine Consent Form    Patient Name:  Nany Guerrero  Patient :  1985     Vaccine(s) Ordered    Tdap Vaccine Greater Than or Equal To 8yo IM        Screening Checklist  The following questions should be completed prior to vaccination. If you answer “yes” to any question, it does not necessarily mean you should not be vaccinated. It just means we may need to clarify or ask more questions. If a question is unclear, please ask your healthcare provider to explain it.    Yes No   Any fever or moderate to severe illness today (mild illness and/or antibiotic treatment are not contraindications)?     Do you have a history of a serious reaction to any previous vaccinations, such as anaphylaxis, encephalopathy within 7 days, Guillain-[place] syndrome within 6 weeks, seizure?     Have you received any live vaccine(s) (e.g MMR, FRANCISCO) or any other vaccines in the last month (to ensure duplicate doses aren't given)?     Do you have an anaphylactic allergy to latex (DTaP, DTaP-IPV, Hep A, Hep B, MenB, RV, Td, Tdap), baker’s yeast (Hep B, HPV), polysorbates (RSV, nirsevimab, PCV 20 and 21, Rotavirrus, Tdap, Shingrix), or gelatin (FRANCISCO, MMR)?     Do you have an anaphylactic allergy to neomycin (Rabies, FRANCISCO, MMR, IPV, Hep A), polymyxin B (IPV), or streptomycin (IPV)?      Any cancer, leukemia, AIDS, or other immune system disorder? (FRANCISCO, MMR, RV)     Do you have a parent, brother, or sister with an immune system problem (if immune competence of vaccine recipient clinically verified, can proceed)? (MMR, FRANCISCO)     Any recent steroid treatments for >2 weeks, chemotherapy, or radiation treatment? (FRANCISCO, MMR)     Have you received antibody-containing blood transfusions or IVIG in the past 11 months (recommended interval is dependent on product)? (MMR, FRANCISCO)     Have you taken antiviral drugs (acyclovir, famciclovir, valacyclovir for FRANCISCO) in the last 24 or 48 hours, respectively?      Are you pregnant or planning to  "become pregnant within 1 month? (FRANCISCO, MMR, HPV, IPV, MenB, Abrexvy; For Hep B- refer to Engerix-B; For RSV - Abrysvo is indicated for 32-36 weeks of pregnancy from September to January)     For infants, have you ever been told your child has had intussusception or a medical emergency involving obstruction of the intestine (Rotavirus)? If not for an infant, can skip this question.         *Ordering Physicians/APC should be consulted if \"yes\" is checked by the patient or guardian above.  I have received, read, and understand the Vaccine Information Statement (VIS) for each vaccine ordered.  I have considered my or my child's health status as well as the health status of my close contacts.  I have taken the opportunity to discuss my vaccine questions with my or my child's health care provider.   I have requested that the ordered vaccine(s) be given to me or my child.  I understand the benefits and risks of the vaccines.  I understand that I should remain in the clinic for 15 minutes after receiving the vaccine(s).  _________________________________________________________  Signature of Patient or Parent/Legal Guardian ____________________  Date     "